# Patient Record
Sex: FEMALE | Race: WHITE | NOT HISPANIC OR LATINO
[De-identification: names, ages, dates, MRNs, and addresses within clinical notes are randomized per-mention and may not be internally consistent; named-entity substitution may affect disease eponyms.]

---

## 2019-07-03 ENCOUNTER — TRANSCRIPTION ENCOUNTER (OUTPATIENT)
Age: 49
End: 2019-07-03

## 2019-07-03 ENCOUNTER — OUTPATIENT (OUTPATIENT)
Dept: OUTPATIENT SERVICES | Facility: HOSPITAL | Age: 49
LOS: 1 days | End: 2019-07-03
Payer: COMMERCIAL

## 2019-07-03 DIAGNOSIS — Z90.711 ACQUIRED ABSENCE OF UTERUS WITH REMAINING CERVICAL STUMP: Chronic | ICD-10-CM

## 2019-07-03 DIAGNOSIS — I48.0 PAROXYSMAL ATRIAL FIBRILLATION: ICD-10-CM

## 2019-07-03 DIAGNOSIS — R55 SYNCOPE AND COLLAPSE: ICD-10-CM

## 2019-07-03 LAB
ANION GAP SERPL CALC-SCNC: 12 MMOL/L — SIGNIFICANT CHANGE UP (ref 5–17)
APTT BLD: 29.6 SEC — SIGNIFICANT CHANGE UP (ref 27.5–36.3)
BUN SERPL-MCNC: 10 MG/DL — SIGNIFICANT CHANGE UP (ref 8–20)
CALCIUM SERPL-MCNC: 9.8 MG/DL — SIGNIFICANT CHANGE UP (ref 8.6–10.2)
CHLORIDE SERPL-SCNC: 103 MMOL/L — SIGNIFICANT CHANGE UP (ref 98–107)
CO2 SERPL-SCNC: 23 MMOL/L — SIGNIFICANT CHANGE UP (ref 22–29)
CREAT SERPL-MCNC: 0.7 MG/DL — SIGNIFICANT CHANGE UP (ref 0.5–1.3)
GLUCOSE SERPL-MCNC: 92 MG/DL — SIGNIFICANT CHANGE UP (ref 70–115)
HCT VFR BLD CALC: 33.6 % — LOW (ref 34.5–45)
HGB BLD-MCNC: 11.2 G/DL — LOW (ref 11.5–15.5)
INR BLD: 0.92 RATIO — SIGNIFICANT CHANGE UP (ref 0.88–1.16)
MAGNESIUM SERPL-MCNC: 2.1 MG/DL — SIGNIFICANT CHANGE UP (ref 1.6–2.6)
MCHC RBC-ENTMCNC: 30.4 PG — SIGNIFICANT CHANGE UP (ref 27–34)
MCHC RBC-ENTMCNC: 33.3 GM/DL — SIGNIFICANT CHANGE UP (ref 32–36)
MCV RBC AUTO: 91.3 FL — SIGNIFICANT CHANGE UP (ref 80–100)
PLATELET # BLD AUTO: 240 K/UL — SIGNIFICANT CHANGE UP (ref 150–400)
POTASSIUM SERPL-MCNC: 4.1 MMOL/L — SIGNIFICANT CHANGE UP (ref 3.5–5.3)
POTASSIUM SERPL-SCNC: 4.1 MMOL/L — SIGNIFICANT CHANGE UP (ref 3.5–5.3)
PROTHROM AB SERPL-ACNC: 10.6 SEC — SIGNIFICANT CHANGE UP (ref 10–12.9)
RBC # BLD: 3.68 M/UL — LOW (ref 3.8–5.2)
RBC # FLD: 12.5 % — SIGNIFICANT CHANGE UP (ref 10.3–14.5)
SODIUM SERPL-SCNC: 138 MMOL/L — SIGNIFICANT CHANGE UP (ref 135–145)
WBC # BLD: 6.67 K/UL — SIGNIFICANT CHANGE UP (ref 3.8–10.5)
WBC # FLD AUTO: 6.67 K/UL — SIGNIFICANT CHANGE UP (ref 3.8–10.5)

## 2019-07-03 PROCEDURE — 93325 DOPPLER ECHO COLOR FLOW MAPG: CPT

## 2019-07-03 PROCEDURE — 85730 THROMBOPLASTIN TIME PARTIAL: CPT

## 2019-07-03 PROCEDURE — 36415 COLL VENOUS BLD VENIPUNCTURE: CPT

## 2019-07-03 PROCEDURE — 83735 ASSAY OF MAGNESIUM: CPT

## 2019-07-03 PROCEDURE — 93312 ECHO TRANSESOPHAGEAL: CPT

## 2019-07-03 PROCEDURE — 93005 ELECTROCARDIOGRAM TRACING: CPT

## 2019-07-03 PROCEDURE — 85610 PROTHROMBIN TIME: CPT

## 2019-07-03 PROCEDURE — 80048 BASIC METABOLIC PNL TOTAL CA: CPT

## 2019-07-03 PROCEDURE — 85027 COMPLETE CBC AUTOMATED: CPT

## 2019-07-03 PROCEDURE — 93010 ELECTROCARDIOGRAM REPORT: CPT

## 2019-07-03 PROCEDURE — 93320 DOPPLER ECHO COMPLETE: CPT

## 2019-07-03 RX ORDER — RISPERIDONE 4 MG/1
3 TABLET ORAL ONCE
Refills: 0 | Status: COMPLETED | OUTPATIENT
Start: 2019-07-03 | End: 2019-07-03

## 2019-07-03 RX ORDER — CEPHALEXIN 500 MG
0 CAPSULE ORAL
Qty: 0 | Refills: 0 | DISCHARGE

## 2019-07-03 RX ORDER — CHOLECALCIFEROL (VITAMIN D3) 125 MCG
1 CAPSULE ORAL
Qty: 0 | Refills: 0 | DISCHARGE

## 2019-07-03 RX ADMIN — RISPERIDONE 3 MILLIGRAM(S): 4 TABLET ORAL at 18:07

## 2019-07-03 NOTE — H&P PST ADULT - RS GEN PE MLT RESP DETAILS PC
normal/good air movement/respirations non-labored/airway patent/breath sounds equal/clear to auscultation bilaterally/no chest wall tenderness

## 2019-07-03 NOTE — DISCHARGE NOTE PROVIDER - NSDCCPTREATMENT_GEN_ALL_CORE_FT
PRINCIPAL PROCEDURE  Procedure: Echocardiogram, transesophageal  Findings and Treatment: no pfo  no thrombus  EF 65%      SECONDARY PROCEDURE  Procedure: Insertion, implantable loop recorder  Findings and Treatment: site care PRINCIPAL PROCEDURE  Procedure: Echocardiogram, transesophageal  Findings and Treatment: no pfo  no thrombus  EF 65%      SECONDARY PROCEDURE  Procedure: Insertion, implantable loop recorder  Findings and Treatment: site care  steristips dermabond and suture in place do not remove any of your dressing  follow up outpt with dr. alarcon

## 2019-07-03 NOTE — DISCHARGE NOTE NURSING/CASE MANAGEMENT/SOCIAL WORK - NSDCDPATPORTLINK_GEN_ALL_CORE
You can access the [facility] Patient Portal, offered by [facility], by registering with the following website: [email or web address]

## 2019-07-03 NOTE — DISCHARGE NOTE PROVIDER - CARE PROVIDER_API CALL
Poppy Almaguer)  Internal Medicine  [address] [place] [id number]  Phone: [phone]  Fax: [phone]  Follow Up Time:

## 2019-07-03 NOTE — H&P PST ADULT - OTHER CARE PROVIDERS
Dr. Almaguer (Cardiologist) Dr. Almaguer (Cardiologist), Dr. Wick (Neurologist), Dr. Fontanez (Psychiatrist)

## 2019-07-03 NOTE — PROGRESS NOTE ADULT - SUBJECTIVE AND OBJECTIVE BOX
[place] [facility]                                   [address] [place] [address]                                                      PHONE: [phone]                                                         FAX: [phone]  [email or web address]  ---------------------------------------------------------------------------------------------------------------------------------    Overnight events/patient complaints: here for SERGO poss loop to exclude CSE      Bactrim (Rash)  ciprofloxacin (Rash)  contrast media (iodine-based) (Anaphylaxis)  Peaches (Rash)  Pears (Rash)  Plums (Rash)  shellfish. (Rash)  sulfa drugs (Rash)  sulfamethoxazole-trimethoprim (Rash)  walnut (Rash)    MEDICATIONS  (STANDING):    MEDICATIONS  (PRN):      Vital Signs Last 24 Hrs  T(C): --  T(F): --  HR: --  BP: --  BP(mean): --  RR: --  SpO2: --  Daily     Daily   ICU Vital Signs Last 24 Hrs  AMINA ALY  I&O's Detail    I&O's Summary    Drug Dosing Weight  AMINACRISTIANO WISEGENIE      PHYSICAL EXAM:  General: Appears well developed, well nourished alert and cooperative.  HEENT: Head; normocephalic, atraumatic.  Eyes: Pupils reactive, cornea wnl.  Neck: Supple, no nodes adenopathy, no NVD or carotid bruit or thyromegaly.  CARDIOVASCULAR: Normal S1 and S2, No murmur, rub, gallop or lift.   LUNGS: No rales, rhonchi or wheeze. Normal breath sounds bilaterally.  ABDOMEN: Soft, nontender without mass or organomegaly. bowel sounds normoactive.  EXTREMITIES: No clubbing, cyanosis or edema. Distal pulses wnl.   SKIN: warm and dry with normal turgor.  NEURO: Alert/oriented x 3/normal motor exam. No pathologic reflexes.    PSYCH: normal affect.        LABS:                        11.2   6.67  )-----------( 240      ( 03 Jul 2019 12:57 )             33.6     07-03    138  |  103  |  10.0  ----------------------------<  92  4.1   |  23.0  |  0.70    Ca    9.8      03 Jul 2019 12:57  Mg     2.1     07-03      AMINA ALY      PT/INR - ( 03 Jul 2019 12:57 )   PT: 10.6 sec;   INR: 0.92 ratio         PTT - ( 03 Jul 2019 12:57 )  PTT:29.6 sec      SERGO performd with anesthesia standby, pt tolerated well  Results:  LVEF 65%  Trace MR mild LAE  Normal AV  Normal TV   No PFO   No intracardiac thrmbus  Normal aortic contour  No CSE detected

## 2019-07-03 NOTE — DISCHARGE NOTE PROVIDER - NSDCCPCAREPLAN_GEN_ALL_CORE_FT
PRINCIPAL DISCHARGE DIAGNOSIS  Diagnosis: Status post transesophageal echocardiogram (SERGO)  Assessment and Plan of Treatment: EF 65%  trace MR mild LAE  normal AV, TV  no PFO  no thrombus      SECONDARY DISCHARGE DIAGNOSES  Diagnosis: Status post placement of implantable loop recorder  Assessment and Plan of Treatment:

## 2019-07-03 NOTE — H&P PST ADULT - NSICDXPROBLEM_GEN_ALL_CORE_FT
PROBLEM DIAGNOSES  Problem: Syncope  Assessment and Plan: -SERGO with Dr. Almaguer  -Procedure d/w pt; risks and benefits explained, questions answered

## 2019-07-03 NOTE — H&P PST ADULT - ASSESSMENT
This is a 50 y/o F, former smoker, PMHx of HTN, HLD, CVA/ syncopal episodes presents today for SERGO to exclude cardiac etiology with Dr. Almaguer

## 2019-07-03 NOTE — PROGRESS NOTE ADULT - ASSESSMENT
Assessment  Cryptogenic CVA-lacunar infarct  Syncope  Prior h/o HTN  No cardiac source emboli detected on SERGO  Mild LAE r/o PAF  HLD      Rec   cont asa   implantable loop today to exclude PAF or bradycardia  close BP monitoring

## 2019-07-03 NOTE — DISCHARGE NOTE PROVIDER - HOSPITAL COURSE
This is a 50 y/o white F, former smoker, with a PMHx anxiety, asthma (intermittently steroid dependent; last treatment 1 year ago), depression, HTN, HLD, cryptogenic CVA (lacunar infarct possibly r/t HTN), syncopal episodes, cyclothymic mood disorder;    now s/p SERGO with Dr. Almaguer to exclude a cardiac source.     SERGO performd with anesthesia standby, pt tolerated well    Results:    LVEF 65%    Trace MR mild LAE    Normal AV    Normal TV     No PFO     No intracardiac thrmbus    Normal aortic contour    No CSE detected        s/p ILR implantation with Dr. Smith            General: Appears well developed, well nourished alert and cooperative.    Neck: Supple, no nodes adenopathy, no NVD or carotid bruit     CARDIOVASCULAR: Normal S1 and S2, No murmur, rub, gallop     LUNGS: No rales, rhonchi or wheeze. Normal breath sounds bilaterally.    ABDOMEN: Soft, nontender without mass or organomegaly. bowel sounds normoactive.    EXTREMITIES: No clubbing, cyanosis or edema. Distal pulses wnl.     SKIN: warm and dry with normal turgor.    NEURO: Alert/oriented x 3/normal motor exam. No pathologic reflexes.      PSYCH: flat affect. This is a 48 y/o white F, former smoker, with a PMHx anxiety, asthma (intermittently steroid dependent; last treatment 1 year ago), depression, HTN, HLD, cryptogenic CVA (lacunar infarct possibly r/t HTN), syncopal episodes, cyclothymic mood disorder;    now s/p SERGO with Dr. Almaguer to exclude a cardiac source.     SERGO performd with anesthesia standby, pt tolerated well    Results:    LVEF 65%    Trace MR mild LAE    Normal AV    Normal TV     No PFO     No intracardiac thrmbus    Normal aortic contour    No CSE detected        s/p ILR implantation with Dr. Smith    +sutures, steristrips and dermabond            General: Appears well developed, well nourished alert and cooperative.    Neck: Supple, no nodes adenopathy, no NVD or carotid bruit     CARDIOVASCULAR: Normal S1 and S2, No murmur, rub, gallop     LUNGS: No rales, rhonchi or wheeze. Normal breath sounds bilaterally.    ABDOMEN: Soft, nontender without mass or organomegaly. bowel sounds normoactive.    EXTREMITIES: No clubbing, cyanosis or edema. Distal pulses wnl.     SKIN: warm and dry with normal turgor.    NEURO: Alert/oriented x 3/normal motor exam. No pathologic reflexes.      PSYCH: flat affect.

## 2019-07-03 NOTE — H&P PST ADULT - NSICDXPASTMEDICALHX_GEN_ALL_CORE_FT
PAST MEDICAL HISTORY:  Allergic to IV contrast anaphylaxis    Asthma     Former cigarette smoker     H/O multiple allergies     HLD (hyperlipidemia)     HTN (hypertension)     Pseudoseizures     Syncope

## 2019-07-03 NOTE — H&P PST ADULT - HISTORY OF PRESENT ILLNESS
This is a 50 y/o white F, former smoker, with a PMHx anxiety, asthma (intermittently steroid dependent), depression, HTN, HLD, cryptogenic CVA (lacunar infarct possibly r/t HTN), syncopal episodes, cyclothymic mood disorder, presents today for SERGO with Dr. Almaguer to exclude a cardiac source.   Currently denies chest pain, SOB, palps, dysnpea, lower extremity edema.    ASA and Mallampati per anesthesia     Echo report from 8/24/18 revealed normal LV size, mild concentric LVH, LAD normal in size, RV cavity size normal, RV global systolic function, RA cavity size normal, no evidence of aortic regurgitation, no evidence of aortic stenosis, no evidence of mitral regurg, no evidence of mitral stenosis, no pulmonic stenosis, EF 60-65%, RV global systolic function is normal, mild TR This is a 50 y/o white F, former smoker, with a PMHx anxiety, asthma (intermittently steroid dependent; last treatment 1 year ago), depression, HTN, HLD, cryptogenic CVA (lacunar infarct possibly r/t HTN), syncopal episodes, cyclothymic mood disorder, presents today for SERGO with Dr. Almaguer to exclude a cardiac source.   Currently denies chest pain, SOB, palps, dysnpea, lower extremity edema.    ASA and Mallampati per anesthesia     Echo report from 8/24/18 revealed normal LV size, mild concentric LVH, LAD normal in size, RV cavity size normal, RV global systolic function, RA cavity size normal, no evidence of aortic regurgitation, no evidence of aortic stenosis, no evidence of mitral regurg, no evidence of mitral stenosis, no pulmonic stenosis, EF 60-65%, RV global systolic function is normal, mild TR This is a 48 y/o white F, former smoker, with a PMHx anxiety, asthma (intermittently steroid dependent; last treatment 1 year ago), depression, HTN, HLD, cryptogenic CVA (lacunar infarct possibly r/t HTN), syncopal episodes, cyclothymic mood disorder, presents today for SERGO with Dr. Almaguer to exclude a cardiac source.   S/P SERGO pt to have ILR  Currently denies chest pain, SOB, palps, dysnpea, lower extremity edema.    ASA and Mallampati per anesthesia     Echo report from 8/24/18 revealed normal LV size, mild concentric LVH, LAD normal in size, RV cavity size normal, RV global systolic function, RA cavity size normal, no evidence of aortic regurgitation, no evidence of aortic stenosis, no evidence of mitral regurg, no evidence of mitral stenosis, no pulmonic stenosis, EF 60-65%, RV global systolic function is normal, mild TR

## 2022-08-03 PROBLEM — Z91.89 OTHER SPECIFIED PERSONAL RISK FACTORS, NOT ELSEWHERE CLASSIFIED: Chronic | Status: ACTIVE | Noted: 2019-07-03

## 2022-08-03 PROBLEM — I10 ESSENTIAL (PRIMARY) HYPERTENSION: Chronic | Status: ACTIVE | Noted: 2019-07-03

## 2022-08-03 PROBLEM — R55 SYNCOPE AND COLLAPSE: Chronic | Status: ACTIVE | Noted: 2019-07-03

## 2022-08-03 PROBLEM — F44.5 CONVERSION DISORDER WITH SEIZURES OR CONVULSIONS: Chronic | Status: ACTIVE | Noted: 2019-07-03

## 2022-08-03 PROBLEM — Z87.891 PERSONAL HISTORY OF NICOTINE DEPENDENCE: Chronic | Status: ACTIVE | Noted: 2019-07-03

## 2022-08-03 PROBLEM — Z91.041 RADIOGRAPHIC DYE ALLERGY STATUS: Chronic | Status: ACTIVE | Noted: 2019-07-03

## 2022-08-03 PROBLEM — J45.909 UNSPECIFIED ASTHMA, UNCOMPLICATED: Chronic | Status: ACTIVE | Noted: 2019-07-03

## 2022-08-03 PROBLEM — E78.5 HYPERLIPIDEMIA, UNSPECIFIED: Chronic | Status: ACTIVE | Noted: 2019-07-03

## 2022-08-16 ENCOUNTER — RESULT CHARGE (OUTPATIENT)
Age: 52
End: 2022-08-16

## 2022-08-16 ENCOUNTER — APPOINTMENT (OUTPATIENT)
Dept: OBGYN | Facility: CLINIC | Age: 52
End: 2022-08-16

## 2022-08-16 VITALS
SYSTOLIC BLOOD PRESSURE: 120 MMHG | HEIGHT: 60 IN | BODY MASS INDEX: 40.25 KG/M2 | WEIGHT: 205 LBS | DIASTOLIC BLOOD PRESSURE: 80 MMHG

## 2022-08-16 DIAGNOSIS — Z01.419 ENCOUNTER FOR GYNECOLOGICAL EXAMINATION (GENERAL) (ROUTINE) W/OUT ABNORMAL FINDINGS: ICD-10-CM

## 2022-08-16 PROBLEM — Z00.00 ENCOUNTER FOR PREVENTIVE HEALTH EXAMINATION: Status: ACTIVE | Noted: 2022-08-16

## 2022-08-16 LAB
HCG UR QL: POSITIVE
QUALITY CONTROL: YES

## 2022-08-16 PROCEDURE — 81025 URINE PREGNANCY TEST: CPT

## 2022-08-16 PROCEDURE — 99386 PREV VISIT NEW AGE 40-64: CPT

## 2022-08-16 PROCEDURE — 99213 OFFICE O/P EST LOW 20 MIN: CPT | Mod: 25

## 2022-08-16 NOTE — DISCUSSION/SUMMARY
[FreeTextEntry1] : disc with Dr. vargas this pt has a + preg test, a lot of pelvic pain , had hysterectomy 10 yrs ago. \par She will have a vag sono and  beta HCG . and follow up \par Poss will need a CT or MRI pt aware

## 2022-08-16 NOTE — PHYSICAL EXAM
[Appropriately responsive] : appropriately responsive [Alert] : alert [No Acute Distress] : no acute distress [No Lymphadenopathy] : no lymphadenopathy [Soft] : soft [Non-tender] : non-tender [Non-distended] : non-distended [No HSM] : No HSM [No Lesions] : no lesions [No Mass] : no mass [Oriented x3] : oriented x3 [Examination Of The Breasts] : a normal appearance [No Masses] : no breast masses were palpable [Labia Majora] : normal [Labia Minora] : normal [Normal] : normal [Motion Tenderness] : motion tenderness [Uterine Adnexae] : normal [No Tenderness] : no tenderness [FreeTextEntry5] : cmt [FreeTextEntry9] : -mass,-guiac

## 2022-08-16 NOTE — HISTORY OF PRESENT ILLNESS
[Patient reported colonoscopy was normal] : Patient reported colonoscopy was normal [Currently Active] : currently active [Men] : men [Vaginal] : vaginal [No] : No [FreeTextEntry1] : 53 yo here for a new pt visit. \par Hx of a stroke 2019 no residual now . \par  Hysterectomy 10 yrs ago for endometriosis\par normal paps\par Ingris last year had cyst thjat was bx -benign. \par SHe wears a LOOp recorder since her stroke. sent to cardiologist. \par \par Pt went to [facility], she had bad HA ,vomiting, and a shiv cardia. She had a preg test +, sent her to the ER , she was dx with shiv cardia and vertigo. SHe has bad cramps now and some spotting yesterday. \par SHe still has contstant pelvic pain. She states its like a stabing pain, occ with resolve, \par + dysparunia, no pain with BM , some disc with urination , she has a DC.  [ColonoscopyDate] : 2020 [TextBox_43] : rtn 5yrs, endoscopy +ulcer

## 2022-08-18 LAB
HCG SERPL-MCNC: 11 MIU/ML
HPV HIGH+LOW RISK DNA PNL CVX: NOT DETECTED

## 2022-08-22 ENCOUNTER — NON-APPOINTMENT (OUTPATIENT)
Age: 52
End: 2022-08-22

## 2022-08-22 LAB — CYTOLOGY CVX/VAG DOC THIN PREP: NORMAL

## 2022-08-29 ENCOUNTER — NON-APPOINTMENT (OUTPATIENT)
Age: 52
End: 2022-08-29

## 2022-08-30 ENCOUNTER — ASOB RESULT (OUTPATIENT)
Age: 52
End: 2022-08-30

## 2022-08-30 ENCOUNTER — APPOINTMENT (OUTPATIENT)
Dept: ANTEPARTUM | Facility: CLINIC | Age: 52
End: 2022-08-30

## 2022-08-30 ENCOUNTER — APPOINTMENT (OUTPATIENT)
Dept: OBGYN | Facility: CLINIC | Age: 52
End: 2022-08-30

## 2022-08-30 VITALS
WEIGHT: 203 LBS | HEIGHT: 60 IN | DIASTOLIC BLOOD PRESSURE: 78 MMHG | BODY MASS INDEX: 39.85 KG/M2 | SYSTOLIC BLOOD PRESSURE: 120 MMHG

## 2022-08-30 PROCEDURE — 76856 US EXAM PELVIC COMPLETE: CPT | Mod: 59

## 2022-08-30 PROCEDURE — 76830 TRANSVAGINAL US NON-OB: CPT

## 2022-08-30 PROCEDURE — 99213 OFFICE O/P EST LOW 20 MIN: CPT

## 2022-08-30 NOTE — HISTORY OF PRESENT ILLNESS
[FreeTextEntry1] : 51 yo here for a new pt visit. \par Hx of a stroke 2019 no residual now . \par  Hysterectomy 10 yrs ago for endometriosis\par normal paps\par Ingris last year had cyst thjat was bx -benign. \par SHe wears a LOOp recorder since her stroke. sent to cardiologist. \par \par She has every 2-3 days has spotting  had a + preg test. SHe now had a normal sono today \par

## 2022-08-30 NOTE — DISCUSSION/SUMMARY
[FreeTextEntry1] : DIsc with Dr. Oreilly the hcg was 11, normal sono\par   will do an ecc and a HCG tumor marker.

## 2022-08-31 ENCOUNTER — TRANSCRIPTION ENCOUNTER (OUTPATIENT)
Age: 52
End: 2022-08-31

## 2022-09-08 LAB
FSH SERPL-MCNC: 97.3 IU/L
HCG-TM SERPL-MCNC: 10 MIU/ML

## 2022-09-14 ENCOUNTER — APPOINTMENT (OUTPATIENT)
Dept: OBGYN | Facility: CLINIC | Age: 52
End: 2022-09-14

## 2022-09-14 VITALS
HEIGHT: 60 IN | DIASTOLIC BLOOD PRESSURE: 82 MMHG | SYSTOLIC BLOOD PRESSURE: 145 MMHG | WEIGHT: 204 LBS | BODY MASS INDEX: 40.05 KG/M2

## 2022-09-14 PROCEDURE — 57505 ENDOCERVICAL CURETTAGE: CPT

## 2022-09-14 PROCEDURE — 99214 OFFICE O/P EST MOD 30 MIN: CPT | Mod: 25

## 2022-09-17 NOTE — PLAN
[FreeTextEntry1] : 52-year-old female with history of supracervical hysterectomy for endometriosis with vaginal spotting, positive beta hCG, and pelvic pain.\par \par 1.  Vaginal spotting.  Differential diagnosis reviewed with the patient.  ECC performed.  Call parameters reviewed.  She will return to the office in 2 weeks to discuss the results of the ECC.\par \par 2.  Positive serum beta-hCG.  Work-up complete and negative.  Most likely pituitary secretion of beta hCG.\par \par 3.  History of endometriosis.  Chronic pelvic pain.  We discussed options including additional imaging with MRI of the pelvis.  We discussed medical options including Lupron to attempt to put her into menopause.  We also discussed surgical options including a laparoscopic bilateral oophorectomy and trachelectomy.  All risks, benefits and potential complications of all options were reviewed with the patient.  She will think about her options and we will review further at her follow-up visit.\par \par The patient was given the opportunity to ask questions and all were answered to her satisfaction.\par

## 2022-09-17 NOTE — HISTORY OF PRESENT ILLNESS
[FreeTextEntry1] : 52-year-old female presents for consultation and ECC.  The patient is a new patient to our practice to establish care on August 16, 2022.  She has a history of a supracervical hysterectomy for endometriosis in 2010.  She states for 4 years after the procedure she felt very good.  She states after that she began having pelvic pain again.  She states the pelvic pain has been getting worse over the past few years.  She states she cannot take Motrin due to a history of stomach ulcers but takes Tylenol.  She states she has been taking Tylenol more frequently.  She states intercourse is extremely painful.  She states she gets sharp pain with intercourse.  She avoids intercourse because it is so painful.  She states she has been having hot flashes for the past year.  She states 1 month ago she had vaginal spotting.  She went to her primary care and had a positive beta hCG.  She had a work-up done with our office.  Beta-hCG is positive but FSH is high indicative of pituitary secretion of beta hCG.  The patient is here for an ECC to assess her cervical cells secondary to the spotting.  She would also like to discuss her treatment options for pelvic pain.

## 2022-09-17 NOTE — PHYSICAL EXAM
[Chaperone Declined] : Patient declined chaperone [Appropriately responsive] : appropriately responsive [Alert] : alert [No Acute Distress] : no acute distress [Soft] : soft [Non-tender] : non-tender [Non-distended] : non-distended [No HSM] : No HSM [No Lesions] : no lesions [No Mass] : no mass [Oriented x3] : oriented x3 [Labia Majora] : normal [Labia Minora] : normal [Normal] : normal [Absent] : absent [Uterine Adnexae] : normal

## 2022-09-19 LAB — CORE LAB BIOPSY: NORMAL

## 2022-09-27 ENCOUNTER — APPOINTMENT (OUTPATIENT)
Dept: OBGYN | Facility: CLINIC | Age: 52
End: 2022-09-27

## 2022-09-27 VITALS
WEIGHT: 206 LBS | SYSTOLIC BLOOD PRESSURE: 125 MMHG | HEIGHT: 60 IN | DIASTOLIC BLOOD PRESSURE: 77 MMHG | BODY MASS INDEX: 40.44 KG/M2

## 2022-09-27 PROCEDURE — 99214 OFFICE O/P EST MOD 30 MIN: CPT

## 2022-09-29 RX ORDER — LEUPROLIDE ACETATE 3.75 MG
3.75 KIT INTRAMUSCULAR
Qty: 1 | Refills: 5 | Status: ACTIVE | COMMUNITY
Start: 2022-09-29 | End: 1900-01-01

## 2022-10-10 ENCOUNTER — NON-APPOINTMENT (OUTPATIENT)
Age: 52
End: 2022-10-10

## 2022-10-18 ENCOUNTER — APPOINTMENT (OUTPATIENT)
Dept: OBGYN | Facility: CLINIC | Age: 52
End: 2022-10-18

## 2022-10-18 VITALS — WEIGHT: 204 LBS | HEIGHT: 61 IN | BODY MASS INDEX: 38.51 KG/M2

## 2022-10-18 DIAGNOSIS — Z87.42 PERSONAL HISTORY OF OTHER DISEASES OF THE FEMALE GENITAL TRACT: ICD-10-CM

## 2022-10-18 PROCEDURE — 99212 OFFICE O/P EST SF 10 MIN: CPT | Mod: 25

## 2022-10-18 PROCEDURE — 96402 CHEMO HORMON ANTINEOPL SQ/IM: CPT

## 2022-10-18 RX ORDER — LEUPROLIDE ACETATE 3.75 MG
3.75 KIT INTRAMUSCULAR
Qty: 1 | Refills: 0 | Status: COMPLETED | OUTPATIENT
Start: 2022-10-18

## 2022-10-18 RX ADMIN — LEUPROLIDE ACETATE MG: KIT at 00:00

## 2022-11-15 ENCOUNTER — APPOINTMENT (OUTPATIENT)
Dept: OBGYN | Facility: CLINIC | Age: 52
End: 2022-11-15
Payer: COMMERCIAL

## 2022-11-15 VITALS
SYSTOLIC BLOOD PRESSURE: 135 MMHG | DIASTOLIC BLOOD PRESSURE: 78 MMHG | BODY MASS INDEX: 39.46 KG/M2 | HEIGHT: 61 IN | WEIGHT: 209 LBS

## 2022-11-15 PROCEDURE — 96402 CHEMO HORMON ANTINEOPL SQ/IM: CPT

## 2022-11-15 PROCEDURE — 99213 OFFICE O/P EST LOW 20 MIN: CPT | Mod: 25

## 2022-11-26 NOTE — PLAN
[FreeTextEntry1] : 52-year-old female with history of supracervical hysterectomy for endometriosis with vaginal spotting, positive hCG, and pelvic pain.\par \par 1.  Vaginal spotting with positive serum beta-hCG.  ECC was reviewed with the patient.  She is aware that the results are benign.  Her work-up is now complete.  2.  History of endometriosis and chronic pelvic pain.  We discussed options including\par \par 2.  History of endometriosis and chronic pelvic pain.  We discussed treatment options including additional imaging with MRI of the pelvis.  We discussed medical options including Lupron to attempt to put her into menopause.  We also discussed surgical options including a laparoscopic bilateral salpingo-oophorectomy and trachelectomy.  Again, all risks, benefits and potential complications of all options were reviewed with the patient.  She is most interested in starting Lupron.  Rx was given for Lupron injections monthly x6 months.  All risks, benefits and potential complications of Lupron were reviewed with the patient.  She has been taking Motrin and Tylenol around-the-clock for her pain.  As her pain has not gotten better we will try diclofenac 50 mg 3 times daily.  We discussed that if she has a history of stomach ulcers she should take this with food.  We also discussed medications to help coat the lining of her stomach.  We also discussed that she can stagger the doses of diclofenac with Tylenol.  The patient was given the opportunity to ask questions and all were answered to her satisfaction.  She will return to the office for her Lupron injection and to discuss how she is feeling with the new medication regimen.\par \par

## 2022-11-26 NOTE — HISTORY OF PRESENT ILLNESS
[FreeTextEntry1] : 52-year-old female presents for ECC results and to discuss treatment options for pelvic pain and history of endometriosis.  She has a history of a supracervical hysterectomy for endometriosis in 2010.  She states for 4 years after the procedure she felt very good.  She states after that she began having pelvic pain again.  She states the pelvic pain has been getting worse over the past few years.  She states she cannot take Motrin due to a history of stomach ulcers but takes Tylenol.  She states she has been taking Tylenol more frequently.  She states over the past few weeks she has been taking Motrin 600 mg every 4-6 hours because the pain has been interfering with the quality of her life.  She is taking something to help coat her stomach to the history of ulcers.  She states intercourse is extremely painful.  She states she gets sharp pain with intercourse.  She avoids intercourse because it is so painful.  She states she has been having hot flashes for the past year.  She states 1 month ago she had vaginal spotting.  She went to her primary care and had a positive beta hCG.  She had a work-up done with our office.  Beta-hCG is positive but FSH is high indicative of pituitary secretion of beta hCG.  The patient is here for an ECC results.  She would also like to discuss her treatment options for pelvic pain.

## 2022-12-04 PROBLEM — Z87.42 HISTORY OF IRREGULAR MENSTRUAL CYCLES: Status: RESOLVED | Noted: 2022-08-16 | Resolved: 2022-12-04

## 2022-12-04 NOTE — HISTORY OF PRESENT ILLNESS
[FreeTextEntry1] : 52-year-old female presents for lupron injection and to discuss treatment options for pelvic pain and history of endometriosis.  She has a history of a supracervical hysterectomy for endometriosis in 2010.  She states for 4 years after the procedure she felt very good.  She states after that she began having pelvic pain again.  She states the pelvic pain has been getting worse over the past few years.  She states she cannot take Motrin due to a history of stomach ulcers but takes Tylenol.  She states she has been taking Tylenol more frequently.  She states over the past few weeks she has been taking Motrin 600 mg every 4-6 hours because the pain has been interfering with the quality of her life.  She is taking something to help coat her stomach to the history of ulcers.  At her last visit, she was given a rx for diclofenac.  She states she alternated this medication with tylenol but did not get any more relief of her symptoms.  She states intercourse is extremely painful.  She states she gets sharp pain with intercourse.  She avoids intercourse because it is so painful.  She states she has been having hot flashes for the past year.  She would like to try another anti-inflammatory.  She is also here for her first lupron injection.

## 2022-12-04 NOTE — PLAN
[FreeTextEntry1] : 52-year-old female with history of endometriosis and chronic pelvic pain.  At her last visit we had reviewed treatment options for her pain.  She was given a prescription for diclofenac 50 mg 3 times daily.  She states this medication did not provide her any more relief than ibuprofen.  Will Rx naproxen DS.  The patient has a history of stomach ulcers.  She was advised to take NSAIDs only as needed.  We also discussed taking medications with the NSAIDs to help coat the lining of her stomach.  Due to her chronic pelvic pain and history of endometriosis she opted for Lupron.  Her first Lupron injection was given today.  All risk, benefits and potential complications of Lupron reviewed with the patient.  We discussed potential side effects including menopausal symptoms.  Plan for Lupron injection monthly x6 months.  The patient was given the opportunity to ask questions and all were answered to her satisfaction.\par

## 2022-12-13 ENCOUNTER — APPOINTMENT (OUTPATIENT)
Dept: OBGYN | Facility: CLINIC | Age: 52
End: 2022-12-13
Payer: COMMERCIAL

## 2022-12-13 VITALS
BODY MASS INDEX: 39.27 KG/M2 | HEIGHT: 61 IN | WEIGHT: 208 LBS | SYSTOLIC BLOOD PRESSURE: 142 MMHG | DIASTOLIC BLOOD PRESSURE: 70 MMHG

## 2022-12-13 PROCEDURE — 96402 CHEMO HORMON ANTINEOPL SQ/IM: CPT

## 2022-12-13 PROCEDURE — 99213 OFFICE O/P EST LOW 20 MIN: CPT | Mod: 25

## 2022-12-13 RX ORDER — DICLOFENAC POTASSIUM 50 MG/1
50 TABLET, COATED ORAL
Qty: 30 | Refills: 0 | Status: COMPLETED | COMMUNITY
Start: 2022-10-03 | End: 2022-12-13

## 2022-12-29 ENCOUNTER — APPOINTMENT (OUTPATIENT)
Dept: MRI IMAGING | Facility: CLINIC | Age: 52
End: 2022-12-29
Payer: COMMERCIAL

## 2022-12-29 ENCOUNTER — OUTPATIENT (OUTPATIENT)
Dept: OUTPATIENT SERVICES | Facility: HOSPITAL | Age: 52
LOS: 1 days | End: 2022-12-29
Payer: COMMERCIAL

## 2022-12-29 DIAGNOSIS — N80.9 ENDOMETRIOSIS, UNSPECIFIED: ICD-10-CM

## 2022-12-29 DIAGNOSIS — Z90.711 ACQUIRED ABSENCE OF UTERUS WITH REMAINING CERVICAL STUMP: Chronic | ICD-10-CM

## 2022-12-29 PROCEDURE — 72197 MRI PELVIS W/O & W/DYE: CPT

## 2022-12-29 PROCEDURE — A9585: CPT

## 2022-12-29 PROCEDURE — 72197 MRI PELVIS W/O & W/DYE: CPT | Mod: 26

## 2023-01-09 NOTE — HISTORY OF PRESENT ILLNESS
[FreeTextEntry1] : 52-year-old female presents for second lupron injection.  She has chronic pelvic pain and endometriosis.  She states she had bad cramping after her first injection last month that resolved a few days later.  She is also experiencing more hot flashes since starting lupron.  She is wondering if there is anything to take for hot flashes.  \par \par She has a history of a supracervical hysterectomy for endometriosis in 2010.  She states for 4 years after the procedure she felt very good.  She states after that she began having pelvic pain again.  She states the pelvic pain has been getting worse over the past few years.  She was given a rx for diclofenac.  She states she alternated this medication with tylenol but did not get any more relief of her symptoms.  She tried naproxen DS after her last visit and is getting some relief.  She would like a refill of this medication.  She states intercourse is extremely painful.  She states she gets sharp pain with intercourse.  She avoids intercourse because it is so painful.

## 2023-01-09 NOTE — PLAN
[FreeTextEntry1] : 52-year-old female with history of endometriosis and chronic pelvic pain.  At her last visit we had reviewed treatment options for her pain.  Will refill Rx for naproxen DS.  The patient has a history of stomach ulcers.  She was advised to take NSAIDs only as needed.  We also discussed taking medications with the NSAIDs to help coat the lining of her stomach.  Due to her chronic pelvic pain and history of endometriosis she opted for Lupron.  Her second Lupron injection was given today.  All risk, benefits and potential complications of Lupron reviewed with the patient.  We discussed potential side effects including menopausal symptoms.  Her hot flashes have gotten worse over the past month and she would like to discuss treatment options.  Info given for relezin.  We discussed that this is a natural over the counter supplement.  Can take 2-3 months to get maximum effect.  Plan for Lupron injection monthly x6 months.  She will RTO in 1 month for her next lupron injection.  The patient was given the opportunity to ask questions and all were answered to her satisfaction.\par

## 2023-01-19 ENCOUNTER — TRANSCRIPTION ENCOUNTER (OUTPATIENT)
Age: 53
End: 2023-01-19

## 2023-01-23 NOTE — PLAN
[FreeTextEntry1] : 52-year-old female with history of endometriosis and chronic pelvic pain.  At her last visit we had reviewed treatment options for her pain.  Will refill Rx for naproxen DS.  The patient has a history of stomach ulcers.  She was advised to take NSAIDs only as needed.  We also discussed taking medications with the NSAIDs to help coat the lining of her stomach.  Due to her chronic pelvic pain and history of endometriosis she opted for Lupron.  Her third Lupron injection was given today.  All risk, benefits and potential complications of Lupron reviewed with the patient.  We discussed potential side effects including menopausal symptoms.  Her hot flashes have gotten worse and info was given for relezin.  She has not yet started this medication.  We discussed that as this is her third Lupron injection, she should get some relief if the pain is secondary to endometriosis.  She feels as if the Lupron is not helping.  The patient was evaluated today for pelvic floor dysfunction.  No evidence of pelvic floor dysfunction was noted.  However, recommend patient get evaluated by a pelvic floor physical therapist.  We will also order MRI of the pelvis to rule out any pathology.  Discussed with the patient the risks and benefits of continuing Lupron.  We discussed that we can continue Lupron for 6 months in total but that she should get some relief by the 3-month wallace.  She return to the office in 1 month to discuss the results of her MRI and further treatment options.  Discussed with the patient that if she is not getting relief we may need to send her to a specialist.  The patient was given the opportunity to ask questions and all were answered to her satisfaction.\par

## 2023-01-23 NOTE — HISTORY OF PRESENT ILLNESS
[FreeTextEntry1] : 52-year-old female presents for third lupron injection.  She has chronic pelvic pain and endometriosis.  She is still complaining of bad cramping and pelvic pain.  States intercourse is still painful.  She is unsure if she is getting relief from the lupron.  She is experiencing more hot flashes since starting lupron.  She was given information on relezin but has not yet tried the medication.  She is still taking naproxen.  She is requesting a refill.  \par \par She has a history of a supracervical hysterectomy for endometriosis in 2010.  She states for 4 years after the procedure she felt very good.  She states after that she began having pelvic pain again.  She states the pelvic pain has been getting worse over the past few years. She states intercourse is extremely painful.  She states she gets sharp pain with intercourse.  She avoids intercourse because it is so painful.

## 2023-01-27 ENCOUNTER — APPOINTMENT (OUTPATIENT)
Dept: OBGYN | Facility: CLINIC | Age: 53
End: 2023-01-27
Payer: COMMERCIAL

## 2023-01-27 VITALS
DIASTOLIC BLOOD PRESSURE: 70 MMHG | BODY MASS INDEX: 39.27 KG/M2 | HEIGHT: 61 IN | WEIGHT: 208 LBS | SYSTOLIC BLOOD PRESSURE: 132 MMHG

## 2023-01-27 PROCEDURE — 96402 CHEMO HORMON ANTINEOPL SQ/IM: CPT

## 2023-01-27 PROCEDURE — 99213 OFFICE O/P EST LOW 20 MIN: CPT | Mod: 25

## 2023-01-27 RX ORDER — LEUPROLIDE ACETATE 3.75 MG
3.75 KIT INTRAMUSCULAR
Qty: 1 | Refills: 0 | Status: COMPLETED | OUTPATIENT
Start: 2023-01-27

## 2023-01-27 RX ADMIN — LEUPROLIDE ACETATE MG: KIT at 00:00

## 2023-01-27 NOTE — PLAN
[FreeTextEntry1] : 52-year-old female with history of endometriosis and chronic pelvic pain.  Due to her chronic pelvic pain and history of endometriosis she opted for Lupron.  Her fourth Lupron injection was given today.  All risk, benefits and potential complications of Lupron reviewed with the patient.  We discussed potential side effects including menopausal symptoms.  Her hot flashes have gotten worse and info was given for relezin.  She has not yet started this medication.  She is also complaining of vaginal dryness with intercourse.  Info was given on lubricants to use during intercourse.  We discussed oil based versus water based versus silicone based lubricants.  She had an MRI of the pelvis.  We discussed the results of her MRI which was negative.  The patient was evaluated at her last visit for pelvic floor dysfunction.  She has a appointment scheduled with urogynecology for pelvic floor PT on March 15.  Discussed with the patient that now she is getting some relief with the Lupron.  We will continue Lupron for the full treatment course of 6 doses.  We discussed that if she does not feel better after the course of Lupron and pelvic floor PT that we can send her to an endometriosis specialist.  The patient was given the opportunity to ask questions and all were answered to her satisfaction.\par \par

## 2023-01-27 NOTE — HISTORY OF PRESENT ILLNESS
[FreeTextEntry1] : 52-year-old female presents for fourth lupron injection and to review the results of her MRI.  She has chronic pelvic pain and endometriosis.  She has a history of a supracervical hysterectomy for endometriosis in 2010.  She states for 4 years after the procedure she felt very good but then began having very bad pelvic pain.  She states since her last lupron injection she has noticed a slight improvement in her pelvic pain.  She is taking naproxen as needed with good relief.  Prior to starting Lupron, she was unable to be sexually active.  She states she did try to have intercourse over the last month and it has been tolerable as long as she controls the depth of penetration.  She states she is experiencing vaginal dryness.  She is wondering what she can use for lubrication.  She feels as if her pelvic muscles are tight.  She states she made an appointment with pelvic floor PT that is scheduled for March 15.  She is experiencing more hot flashes since starting lupron.  She was given information on relezin but has not yet tried the medication.\par \par Over the last month she was diagnosed with a TIA and seizures.  She is following with neurology.  She was started on amlodipine.

## 2023-02-01 ENCOUNTER — APPOINTMENT (OUTPATIENT)
Dept: UROGYNECOLOGY | Facility: CLINIC | Age: 53
End: 2023-02-01
Payer: COMMERCIAL

## 2023-02-01 VITALS — DIASTOLIC BLOOD PRESSURE: 71 MMHG | TEMPERATURE: 98 F | SYSTOLIC BLOOD PRESSURE: 146 MMHG

## 2023-02-01 DIAGNOSIS — R35.1 NOCTURIA: ICD-10-CM

## 2023-02-01 PROCEDURE — 99205 OFFICE O/P NEW HI 60 MIN: CPT

## 2023-02-01 PROCEDURE — 51798 US URINE CAPACITY MEASURE: CPT

## 2023-02-01 RX ORDER — FLUTICASONE FUROATE AND VILANTEROL TRIFENATATE 200; 25 UG/1; UG/1
200-25 POWDER RESPIRATORY (INHALATION)
Refills: 0 | Status: ACTIVE | COMMUNITY
Start: 2023-02-01

## 2023-02-01 RX ORDER — AMLODIPINE BESYLATE 5 MG/1
5 TABLET ORAL
Qty: 90 | Refills: 1 | Status: ACTIVE | COMMUNITY
Start: 2023-02-01

## 2023-02-01 RX ORDER — LEUPROLIDE ACETATE 3.75 MG
3.75 KIT INTRAMUSCULAR
Refills: 0 | Status: ACTIVE | COMMUNITY
Start: 2023-02-01

## 2023-02-01 RX ORDER — LAMOTRIGINE 200 MG/1
200 TABLET ORAL DAILY
Refills: 0 | Status: ACTIVE | COMMUNITY
Start: 2023-02-01

## 2023-02-01 RX ORDER — GABAPENTIN 600 MG/1
600 TABLET, COATED ORAL
Qty: 270 | Refills: 0 | Status: ACTIVE | COMMUNITY
Start: 2023-02-01

## 2023-02-01 RX ORDER — RISPERIDONE 0.5 MG/1
0.5 TABLET, FILM COATED ORAL
Refills: 0 | Status: ACTIVE | COMMUNITY
Start: 2023-02-01

## 2023-02-01 RX ORDER — GALCANEZUMAB 120 MG/ML
120 INJECTION, SOLUTION SUBCUTANEOUS
Refills: 0 | Status: ACTIVE | COMMUNITY
Start: 2023-02-01

## 2023-02-01 RX ORDER — SERTRALINE HYDROCHLORIDE 50 MG/1
50 TABLET, FILM COATED ORAL DAILY
Refills: 0 | Status: ACTIVE | COMMUNITY
Start: 2023-02-01

## 2023-02-01 RX ORDER — RISPERIDONE 2 MG/1
2 TABLET, FILM COATED ORAL TWICE DAILY
Refills: 0 | Status: ACTIVE | COMMUNITY
Start: 2023-02-01

## 2023-02-01 NOTE — HISTORY OF PRESENT ILLNESS
[FreeTextEntry1] : Christina is a pleasant 54 yo F referred by Dr. Caruso for PFD, CPP, dyspareunia. \par \par Christina had a partial hyst (uterus) in 2010 for endo.\par Endo pain resolved x 2 years.\par Pain started 2012 mild at first has gotten progressively worse over the years. Is on Lupron now, started November. \par Sexually active with 1 male partner. \par Endorses entry (burning) and deep (hitting a wall) dyspareunia.\par No pain with prolonged sitting or tight clothes. \par \par Never able to wear tampon 2/2 pain.\par Not able to differentiate b/l endo and PFD pain.\par MRI wnl December 2022\par \par Menarche age 14, regular q month\par OCP start 18 x 1 year\par No other contraception\par Paps wnl\par No RVVC, RBV\par mammograms wnl, bx benign last year. \par Sister with hx of endometrial CA\par \par Daytime frequency x6-7, nocturia x 2-3, urgency, ETHAN (wears a Poise pad and changes 1x/day), no hematuria, no RUTIs, no kidney stones, feeling of incomplete bladder emptying, no hesitancy. Incontinence since 2010, used biofeedback without improvement. \par She reports her ETHAN is not worsening. \par She does feel like "something is falling out" of her vagina.\par Previous smoker 1ppd [age]. Stopped 2 years ago.\par No  cancer in family\par \par Intermittent constipation\par \par Bipolar, anxiety, depression, seizures (2019), December 2022 TIA, 2020 stroke, CHTN

## 2023-02-01 NOTE — DISCUSSION/SUMMARY
[FreeTextEntry1] : I had an extensive discussion with Christina re: PFD, vulvar pain, dyspareunia, OAB wet, POP. I reviewed the pathologies, possible etiologies, diagnosis, symptoms and treatment options available. Written information was given to the patient.\par \par Due to her hx of stroke and TIA, I elected to start her on Traumeel 10% topical cream ftp BID to introitus along with AK 2/2% topical cream ftp QD-BID to introitus. Amount and location of cream application was demonstrated to patient today in office via mirror demonstration. I explained that the cream does not work overnight and she needs to continue it for a minimum of 6-8 weeks before we re-evaluate efficacy.\par \par Vulvar health techniques rev'd in detail:\par -Warm baths x15-20 mins QD with Aveeno oatmeal\par -Unscented soaps, body washes, bath gels\par -No fabric softeners or dryer sheets on underwear\par -Unscented pads (NOT ALWAYS)\par -Ice to vulva\par -Hypoallergenic lubricants\par -White Crisco as needed\par \par I suggested she take methocarbamol 750mg po BID. Refill sent to pharmacy.\par Referred to PFPT (Lakisha Duffy). Rx in [employer]. Contact info given to patient. Email sent to Lakisha.\par \par PFM relaxation techniques rev'd in detail:\par -Warm baths x15-20 mins QD with Aveeno oatmeal\par -No pushing, straining\par -Avoid constipation\par       *Flax seed oil capsules; 2-3 capsules 2-3x/day (titrate as tolerated)\par       *Miralax\par       *Increase water intake\par -No Kegels, no squeezing\par \par No intercourse x 1 month\par \par I explained to Christina that at this time, I think that most of her pain is coming from her PFMs and her LPV. I explained the concept of "peeling the onion" and suggested that we focus on treatment of those areas first and if after improvement there, I will refer her to Dr. Conner or Dr. Alonzo for evaluation and mgmt of her POP and OAB wet. I explained that since her vulva and PFMs are so exquisitely tender, she is not a candidate for pessary or surgery yet. She is amenable to treatment algorithm.\par \par RTO 2 months

## 2023-02-01 NOTE — PHYSICAL EXAM
[No Acute Distress] : in no acute distress [Well developed] : well developed [Well Nourished] : ~L well nourished [Good Hygeine] : demonstrates good hygeine [Oriented x3] : oriented to person, place, and time [Normal Memory] : ~T memory was ~L unimpaired [Normal Mood/Affect] : mood and affect are normal [No Edema] : ~T edema was not present [None] : no CVA tenderness [Obese] : obese [Normal Gait] : gait was normal [No Lesions] : no lesions were seen on the external genitalia [Vulvar Atrophy] : vulvar atrophy [Labia Majora] : were normal [Labia Minora] : were normal [Atrophy] : atrophy [Rectocele] : a rectocele [Cystocele] : a cystocele [No Bleeding] : there was no active vaginal bleeding [Normal] : no abnormalities [Post Void Residual ____ml] : post void residual was [unfilled] ml [Exam Deferred] : was deferred [Tenderness] : ~T no ~M abdominal tenderness observed [Distended] : not distended [de-identified] : Q-tip touch test 10/10 @ 1,5,6,7,11. Paleness at introitus with reactive erythema at ostia. No fissures, ulcerations, erosions. [FreeTextEntry3] : hypermobile urethra [FreeTextEntry4] : PFMs 3/4 with MTrPs b/l levators, all groups and b/l OI. Exquisite pain to palpation

## 2023-02-02 LAB
APPEARANCE: CLEAR
BACTERIA: NEGATIVE
BILIRUBIN URINE: NEGATIVE
BLOOD URINE: NEGATIVE
COLOR: NORMAL
GLUCOSE QUALITATIVE U: NEGATIVE
HYALINE CASTS: 0 /LPF
KETONES URINE: NEGATIVE
LEUKOCYTE ESTERASE URINE: NEGATIVE
MICROSCOPIC-UA: NORMAL
NITRITE URINE: NEGATIVE
PH URINE: 7
PROTEIN URINE: NEGATIVE
RED BLOOD CELLS URINE: 4 /HPF
SPECIFIC GRAVITY URINE: 1.02
SQUAMOUS EPITHELIAL CELLS: 0 /HPF
UROBILINOGEN URINE: NORMAL
WHITE BLOOD CELLS URINE: 0 /HPF

## 2023-02-03 LAB — BACTERIA UR CULT: NORMAL

## 2023-02-15 ENCOUNTER — TRANSCRIPTION ENCOUNTER (OUTPATIENT)
Age: 53
End: 2023-02-15

## 2023-02-28 ENCOUNTER — APPOINTMENT (OUTPATIENT)
Dept: OBGYN | Facility: CLINIC | Age: 53
End: 2023-02-28
Payer: COMMERCIAL

## 2023-02-28 VITALS
SYSTOLIC BLOOD PRESSURE: 128 MMHG | HEIGHT: 61 IN | DIASTOLIC BLOOD PRESSURE: 76 MMHG | BODY MASS INDEX: 40.59 KG/M2 | WEIGHT: 215 LBS

## 2023-02-28 PROCEDURE — 99213 OFFICE O/P EST LOW 20 MIN: CPT | Mod: 25

## 2023-02-28 PROCEDURE — 96372 THER/PROPH/DIAG INJ SC/IM: CPT

## 2023-02-28 NOTE — HISTORY OF PRESENT ILLNESS
[FreeTextEntry1] : 53-year-old female presents for fifth lupron injection.  She has chronic pelvic pain and endometriosis.  She has a history of a supracervical hysterectomy for endometriosis in 2010.  She states for 4 years after the procedure she felt very good but then began having very bad pelvic pain.  She states since her last lupron injection she has noticed a slight improvement in her pelvic pain.  She is taking naproxen as needed with good relief.  He is requesting a refill on the naproxen.  She saw urogynecology on February 1 and had an evaluation done.  She was started on an anti-inflammatory cream, amitriptyline/ketamine cream, and methocarbamol 3 times a day.  She states that she has seen an improvement in her pelvic pain since starting these medications.  She is scheduled for pelvic floor PT on March 7.  Prior to starting Lupron, she was unable to be sexually active.  She states she has had some improvement with intercourse, however after her urogyn evaluation they told her no penetration for 4 weeks.  She states she is experiencing vaginal dryness.  She is wondering what she can use for lubrication.  She is experiencing more hot flashes since starting lupron.  She was given information on relezin but has not yet tried the medication.  She plans to order this today.\par

## 2023-02-28 NOTE — PLAN
[FreeTextEntry1] : 53-year-old female with history of endometriosis and chronic pelvic pain.  Due to her chronic pelvic pain and history of endometriosis she opted for Lupron.  Her fifth Lupron injection was given today.  All risk, benefits and potential complications of Lupron reviewed with the patient.  We discussed potential side effects including menopausal symptoms.  Her hot flashes have gotten worse and info was given for relezin.  She has not yet started this medication.  She plans order this medication today.  She is also complaining of vaginal dryness with intercourse.  Info was given on lubricants to use during intercourse.  We discussed oil based versus water based versus silicone based lubricants.  She saw urogynecology and was diagnosed with vulvodynia and pelvic floor dysfunction.  She has been taking medications with some relief.  She is scheduled for her first appointment for pelvic floor PT March 7.  We will continue Lupron for the full treatment course of 6 doses.  We discussed that if she does not feel better after the course of Lupron and pelvic floor PT that we can send her to an endometriosis specialist.  The patient was given the opportunity to ask questions and all were answered to her satisfaction.\par \par

## 2023-04-03 RX ORDER — NAPROXEN SODIUM 550 MG/1
550 TABLET ORAL
Qty: 60 | Refills: 3 | Status: ACTIVE | COMMUNITY
Start: 2022-10-19 | End: 1900-01-01

## 2023-04-04 ENCOUNTER — APPOINTMENT (OUTPATIENT)
Dept: OBGYN | Facility: CLINIC | Age: 53
End: 2023-04-04
Payer: COMMERCIAL

## 2023-04-04 VITALS
DIASTOLIC BLOOD PRESSURE: 74 MMHG | BODY MASS INDEX: 41.35 KG/M2 | WEIGHT: 219 LBS | HEIGHT: 61 IN | SYSTOLIC BLOOD PRESSURE: 137 MMHG

## 2023-04-04 PROCEDURE — 96402 CHEMO HORMON ANTINEOPL SQ/IM: CPT

## 2023-04-04 PROCEDURE — 99213 OFFICE O/P EST LOW 20 MIN: CPT | Mod: 25

## 2023-04-04 PROCEDURE — 36415 COLL VENOUS BLD VENIPUNCTURE: CPT

## 2023-04-14 ENCOUNTER — NON-APPOINTMENT (OUTPATIENT)
Age: 53
End: 2023-04-14

## 2023-04-14 ENCOUNTER — APPOINTMENT (OUTPATIENT)
Dept: UROGYNECOLOGY | Facility: CLINIC | Age: 53
End: 2023-04-14

## 2023-04-21 ENCOUNTER — RX RENEWAL (OUTPATIENT)
Age: 53
End: 2023-04-21

## 2023-04-24 ENCOUNTER — TRANSCRIPTION ENCOUNTER (OUTPATIENT)
Age: 53
End: 2023-04-24

## 2023-04-26 ENCOUNTER — APPOINTMENT (OUTPATIENT)
Dept: UROGYNECOLOGY | Facility: CLINIC | Age: 53
End: 2023-04-26
Payer: COMMERCIAL

## 2023-04-26 VITALS
SYSTOLIC BLOOD PRESSURE: 146 MMHG | DIASTOLIC BLOOD PRESSURE: 75 MMHG | OXYGEN SATURATION: 97 % | HEART RATE: 66 BPM | TEMPERATURE: 97.5 F

## 2023-04-26 PROCEDURE — 99214 OFFICE O/P EST MOD 30 MIN: CPT

## 2023-04-26 PROCEDURE — 51798 US URINE CAPACITY MEASURE: CPT

## 2023-04-26 NOTE — HISTORY OF PRESENT ILLNESS
[FreeTextEntry1] : Christina is here for a f/u visit.\par \par She is seeing Lakisha for PFPT weekly. \par Using topical Traumeel and AK 2/2% to vulva. She thinks the topical creams are starting to help. \par She is taking methocarbamol 750mg BID with good results. \par She is doing exercises and using wand at home. \par \par She still has suprapubic stabbing pains. Not associated with urinary symptoms. \par Reports +UUI, especially if she holds her urine too long. \par She endorses 2 episodes of enuresis in the past 1-2 weeks. \par Denies UTI symptoms: no dysuria, hematuria, fever, chills, n/v CVAT. \par \par She has psychiatrist whom she sees regularly.

## 2023-04-26 NOTE — PHYSICAL EXAM
[No Acute Distress] : in no acute distress [Well developed] : well developed [Well Nourished] : ~L well nourished [Good Hygeine] : demonstrates good hygeine [Oriented x3] : oriented to person, place, and time [Normal Memory] : ~T memory was ~L unimpaired [Normal Mood/Affect] : mood and affect are normal [No Edema] : ~T edema was not present [None] : no CVA tenderness [Obese] : obese [Normal Gait] : gait was normal [No Lesions] : no lesions were seen on the external genitalia [Vulvar Atrophy] : vulvar atrophy [Labia Majora] : were normal [Labia Minora] : were normal [Atrophy] : atrophy [Rectocele] : a rectocele [Cystocele] : a cystocele [No Bleeding] : there was no active vaginal bleeding [Normal] : no abnormalities [Post Void Residual ____ml] : post void residual was [unfilled] ml [Exam Deferred] : was deferred [Tenderness] : ~T no ~M abdominal tenderness observed [Distended] : not distended [de-identified] : Q-tip touch test 5/10 @ 1,11 and 7/10 @ 5,6,7. Paleness at introitus with reactive erythema at ostia. No fissures, ulcerations, erosions. [FreeTextEntry3] : hypermobile urethra [FreeTextEntry4] : PFMs 3/4 with MTrPs b/l levators, all groups and b/l OI. Exquisite pain to palpation

## 2023-04-26 NOTE — DISCUSSION/SUMMARY
[FreeTextEntry1] : Continue topical AK 2/2% and Traumeel 10% cream QD-BID as using. \par Continue methocarbamol 750mg BID. \par Continue PFPT with Lakisha weekly. \par \par Told Christina to f/u with neuro for enuresis.\par Will make appointment for Christina to f/u with Dr. Conner for POP, OAB, UUI.\par \par PVR = 0ml\par \par RTO 2-3 months

## 2023-05-01 LAB
BASOPHILS # BLD AUTO: 0.07 K/UL
BASOPHILS NFR BLD AUTO: 0.8 %
EOSINOPHIL # BLD AUTO: 0.12 K/UL
EOSINOPHIL NFR BLD AUTO: 1.4 %
ESTRADIOL SERPL-MCNC: 8 PG/ML
FSH SERPL-MCNC: 14.9 IU/L
HCT VFR BLD CALC: 39 %
HGB BLD-MCNC: 12.4 G/DL
IMM GRANULOCYTES NFR BLD AUTO: 0.2 %
LYMPHOCYTES # BLD AUTO: 1.96 K/UL
LYMPHOCYTES NFR BLD AUTO: 22.8 %
MAN DIFF?: NORMAL
MCHC RBC-ENTMCNC: 29.4 PG
MCHC RBC-ENTMCNC: 31.8 GM/DL
MCV RBC AUTO: 92.4 FL
MONOCYTES # BLD AUTO: 0.56 K/UL
MONOCYTES NFR BLD AUTO: 6.5 %
NEUTROPHILS # BLD AUTO: 5.86 K/UL
NEUTROPHILS NFR BLD AUTO: 68.3 %
PLATELET # BLD AUTO: 320 K/UL
RBC # BLD: 4.22 M/UL
RBC # FLD: 13.4 %
TSH SERPL-ACNC: 1.53 UIU/ML
WBC # FLD AUTO: 8.59 K/UL

## 2023-05-02 ENCOUNTER — APPOINTMENT (OUTPATIENT)
Dept: OBGYN | Facility: CLINIC | Age: 53
End: 2023-05-02
Payer: COMMERCIAL

## 2023-05-02 VITALS
HEIGHT: 61 IN | BODY MASS INDEX: 41.16 KG/M2 | SYSTOLIC BLOOD PRESSURE: 134 MMHG | DIASTOLIC BLOOD PRESSURE: 80 MMHG | WEIGHT: 218 LBS

## 2023-05-02 DIAGNOSIS — R23.2 FLUSHING: ICD-10-CM

## 2023-05-02 DIAGNOSIS — N80.9 ENDOMETRIOSIS, UNSPECIFIED: ICD-10-CM

## 2023-05-02 DIAGNOSIS — N95.2 POSTMENOPAUSAL ATROPHIC VAGINITIS: ICD-10-CM

## 2023-05-02 PROCEDURE — 99213 OFFICE O/P EST LOW 20 MIN: CPT | Mod: 25

## 2023-05-05 ENCOUNTER — NON-APPOINTMENT (OUTPATIENT)
Age: 53
End: 2023-05-05

## 2023-05-05 DIAGNOSIS — N39.0 URINARY TRACT INFECTION, SITE NOT SPECIFIED: ICD-10-CM

## 2023-05-08 ENCOUNTER — APPOINTMENT (OUTPATIENT)
Dept: UROGYNECOLOGY | Facility: CLINIC | Age: 53
End: 2023-05-08

## 2023-05-08 ENCOUNTER — APPOINTMENT (OUTPATIENT)
Dept: UROGYNECOLOGY | Facility: CLINIC | Age: 53
End: 2023-05-08
Payer: COMMERCIAL

## 2023-05-08 DIAGNOSIS — R35.0 FREQUENCY OF MICTURITION: ICD-10-CM

## 2023-05-08 DIAGNOSIS — R30.0 DYSURIA: ICD-10-CM

## 2023-05-08 PROCEDURE — 99213 OFFICE O/P EST LOW 20 MIN: CPT | Mod: 25

## 2023-05-08 PROCEDURE — 51701 INSERT BLADDER CATHETER: CPT | Mod: 59

## 2023-05-08 RX ORDER — PHENAZOPYRIDINE HYDROCHLORIDE 200 MG/1
200 TABLET ORAL 3 TIMES DAILY
Qty: 15 | Refills: 0 | Status: ACTIVE | COMMUNITY
Start: 2023-05-08 | End: 1900-01-01

## 2023-05-09 LAB
APPEARANCE: CLEAR
BACTERIA UR CULT: NORMAL
BACTERIA: NEGATIVE /HPF
BILIRUBIN URINE: NEGATIVE
BLOOD URINE: NEGATIVE
CAST: 0 /LPF
COLOR: YELLOW
EPITHELIAL CELLS: 0 /HPF
GLUCOSE QUALITATIVE U: NEGATIVE MG/DL
KETONES URINE: NEGATIVE MG/DL
LEUKOCYTE ESTERASE URINE: NEGATIVE
MICROSCOPIC-UA: NORMAL
NITRITE URINE: NEGATIVE
PH URINE: 6
PROTEIN URINE: NEGATIVE MG/DL
RED BLOOD CELLS URINE: 0 /HPF
SPECIFIC GRAVITY URINE: 1.01
UROBILINOGEN URINE: 0.2 MG/DL
WHITE BLOOD CELLS URINE: 0 /HPF

## 2023-05-11 LAB — BACTERIA UR CULT: NORMAL

## 2023-05-11 NOTE — DISCUSSION/SUMMARY
[FreeTextEntry1] : Discussed urine seen in office. UA dipstick deferred due to recent AZO use. Will be sending urine for cath UA and urine culture for further evaluation; will follow up with results and treat as needed. Pyridium eRx sent for urinary discomfort. \par Advised to increase hydration.\par Patient concerns addressed, questions answered. patient verbalized understanding.\par \par \par

## 2023-05-11 NOTE — HISTORY OF PRESENT ILLNESS
[FreeTextEntry1] : Pt is a 54yo F who presents to the office today for possible UTI. Pt reports since Thursday 5/4/23 has been experiencing dysuria, increase in urinary frequency, and pelvic pain. Pt denies any fever, chills or lower back pain. Pt was seen by Lakisha on 5/4/23 for PFPT and urine sample was collected.  Pt urine sample was contaminated and pt was sent Monurol on Friday 5/5/23. Pt states took Monurol, but is still feeling dysuria and pelvic pain. Pt has been taking OTC AZO for urinary discomfort.

## 2023-05-19 ENCOUNTER — TRANSCRIPTION ENCOUNTER (OUTPATIENT)
Age: 53
End: 2023-05-19

## 2023-05-22 ENCOUNTER — TRANSCRIPTION ENCOUNTER (OUTPATIENT)
Age: 53
End: 2023-05-22

## 2023-06-06 PROBLEM — N95.2 VAGINAL ATROPHY: Status: ACTIVE | Noted: 2023-06-06

## 2023-06-06 PROBLEM — N80.9 ENDOMETRIOSIS: Status: ACTIVE | Noted: 2022-09-17

## 2023-06-06 PROBLEM — R23.2 HOT FLASHES: Status: ACTIVE | Noted: 2023-06-06

## 2023-06-06 NOTE — PLAN
[FreeTextEntry1] : 53-year-old female with history of endometriosis and chronic pelvic pain.  Due to her chronic pelvic pain and history of endometriosis she opted for Lupron.  All risk, benefits and potential complications of Lupron reviewed with the patient.  We discussed potential side effects including menopausal symptoms.  Her hot flashes have gotten worse and info was given for relezin.  She started this medication 1 month ago but has not noticed any relief as of yet.  We discussed it can take 2 to 3 months for this medication to become fully effective.  She is also complaining of vaginal dryness with intercourse.  Info was given on lubricants to use during intercourse.  We discussed oil based versus water based versus silicone based lubricants.  She was also given information on the vaginal moisturizer, revaree.  She saw urogynecology and was diagnosed with vulvodynia and pelvic floor dysfunction.  She has been taking medications with some relief.  She started pelvic floor PT.  She has a history of bipolar, depression and anxiety.  She feels as if over the last month, her mental health has been more unstable.  She is following with her psychiatrist weekly.  After shared decision making, we decided not to give the sixth dose of Lupron.  We discussed that the side effect of Lupron is that it can but she went to menopause.  The patient states that her menopausal symptoms and mental health have been worse over the last month.  Do not see any benefit of getting the 6 dose of Lupron at this time.  Patient plans to continue with her psychiatrist.  She has an appointment in 1 week.  Information for the crisis center was given.  Call parameters were given.  Rx was given for FSH and estradiol.  She will return to the office in 4 weeks for a follow-up visit.\par \par

## 2023-06-06 NOTE — PLAN
[FreeTextEntry1] : 53-year-old female with\par \par 1.  Chronic pelvic pain and history of endometriosis.  The patient has completed her course of Lupron.  She got 5 out of the 6 doses of Lupron.  The last dose was not completed secondary to menopausal symptoms and changes in her mental health status.\par \par 2.  Pelvic floor dysfunction and vulvodynia.  Continue follow-up with urogynecology.  Continue medications as directed.  Continue pelvic floor physical therapy.\par \par 3.  Hot flashes.  The etiology of hot flashes was reviewed with the patient.  She has been taking relizen for 2 months with no relief.  We discussed other options for hot flashes.  We discussed that SSRIs can help with hot flashes.  We also discussed that she cannot take hormone replacement therapy secondary to her history of stroke/TIA.  She will discuss this further with her psychiatrist.  Advised the patient that her psychiatrist can call our office if she has any questions.\par \par 4.  Vaginal dryness.  The patient was counseled on lubrication's with intercourse.  We discussed oil-based versus silicone-based versus water-based lubricants.  We also discussed vaginal moisturizers.  Information was given on revaree.  She was aware that she will need to try this medication for 2 to 3 months.\par \par 5.  FSH and estradiol reviewed with the patient.  FSH 15.  Estradiol 8.  We discussed that these labs are most likely suggestive of perimenopause.\par \par The patient was given the opportunity to ask questions and all were answered to her satisfaction.  She will return to the office for a follow-up and well woman exam in August 2023.

## 2023-06-06 NOTE — HISTORY OF PRESENT ILLNESS
[FreeTextEntry1] : 53-year-old female presents for blood work results and follow up visit.  She has chronic pelvic pain and endometriosis.  She has a history of a supracervical hysterectomy for endometriosis in 2010.  She states for 4 years after the procedure she felt very good but then began having very bad pelvic pain.  She had 5 lupron injections and did not get the 6th secondary to menopausal symptoms and mental health issues.  Pelvi pain is improving.  She is taking naproxen as needed with good relief.  She is seeing urogynecology for vulvodynia and pelvic floor dysfunction.  She was started on an anti-inflammatory cream, amitriptyline/ketamine cream, and methocarbamol 3 times a day.  She states that she has seen an improvement in her pelvic pain since starting these medications and pelvic floor PT.  Prior to starting Lupron, she was unable to be sexually active.  She states she has had some improvement with intercourse.  \par \par She states over the past few months her menopausal symptoms have gotten much worse.  At her last visit, she complained of vaginal dryness.  We discussed different types of lubrication's.  She has tried them with some relief.  We also discussed vaginal moisturizers.  The patient states her hot flashes have been very bothersome.  She states she is taking relizen for the past 2 months with no relief.  The patient has a history of a stroke and TIA and cannot take hormones.  The patient also states that she follows with a psychiatrist every 2 weeks.  She states that her last visit she had noticed that her mental health was not as good as it is usually been.  She has a history of bipolar, depression and anxiety.  She is currently taking lamotrigine, risperidone, gabapentin and Zoloft.  Today, she denies any suicidal or homicidal ideation.  She states since her last visit, her mental health has improved.  She is now seeing her psychiatrist monthly.  \par

## 2023-06-06 NOTE — HISTORY OF PRESENT ILLNESS
[FreeTextEntry1] : 53-year-old female presents for sixth and final lupron injection.  She has chronic pelvic pain and endometriosis.  She has a history of a supracervical hysterectomy for endometriosis in 2010.  She states for 4 years after the procedure she felt very good but then began having very bad pelvic pain.  She states since her last lupron injection she has noticed a slight improvement in her pelvic pain.  She is taking naproxen as needed with good relief.  She saw urogynecology on February 1 and had an evaluation done.  She was started on an anti-inflammatory cream, amitriptyline/ketamine cream, and methocarbamol 3 times a day.  She states that she has seen an improvement in her pelvic pain since starting these medications.  She is started pelvic floor PT.  Prior to starting Lupron, she was unable to be sexually active.  She states she has had some improvement with intercourse.  She states over the past month her menopausal symptoms have gotten much worse.  At her last visit, she complained of vaginal dryness.  We discussed different types of lubrication's.  She has not yet started using lubrication.  We reviewed lubrications again.  We also discussed vaginal moisturizers.  The patient states her hot flashes have been very bothersome.  She states she is taking relizen for the past month with no relief.  The patient has a history of a stroke and TIA and cannot take hormones.  The patient also states that she follows with a psychiatrist every 2 weeks.  She states that her last visit she had noticed that her mental health was not as good as it is usually been.  She has a history of bipolar, depression and anxiety.  She is currently taking lamotrigine, risperidone, gabapentin and Zoloft.  Today, she denies any suicidal or homicidal ideation.  She states her psychiatrist is concerned about her and is now seeing her weekly.\par

## 2023-06-30 ENCOUNTER — APPOINTMENT (OUTPATIENT)
Dept: UROGYNECOLOGY | Facility: CLINIC | Age: 53
End: 2023-06-30
Payer: COMMERCIAL

## 2023-06-30 VITALS — DIASTOLIC BLOOD PRESSURE: 78 MMHG | SYSTOLIC BLOOD PRESSURE: 148 MMHG | TEMPERATURE: 98 F

## 2023-06-30 DIAGNOSIS — N32.81 OVERACTIVE BLADDER: ICD-10-CM

## 2023-06-30 DIAGNOSIS — N94.10 UNSPECIFIED DYSPAREUNIA: ICD-10-CM

## 2023-06-30 DIAGNOSIS — M79.18 MYALGIA, OTHER SITE: ICD-10-CM

## 2023-06-30 DIAGNOSIS — N39.46 MIXED INCONTINENCE: ICD-10-CM

## 2023-06-30 DIAGNOSIS — N81.9 FEMALE GENITAL PROLAPSE, UNSPECIFIED: ICD-10-CM

## 2023-06-30 DIAGNOSIS — R10.2 PELVIC AND PERINEAL PAIN: ICD-10-CM

## 2023-06-30 DIAGNOSIS — N94.819 VULVODYNIA, UNSPECIFIED: ICD-10-CM

## 2023-06-30 DIAGNOSIS — G89.29 PELVIC AND PERINEAL PAIN: ICD-10-CM

## 2023-06-30 PROCEDURE — 99214 OFFICE O/P EST MOD 30 MIN: CPT

## 2023-06-30 RX ORDER — FLUOXETINE HYDROCHLORIDE 10 MG/1
10 CAPSULE ORAL
Refills: 0 | Status: ACTIVE | COMMUNITY
Start: 2023-06-30

## 2023-06-30 RX ORDER — LIRAGLUTIDE 6 MG/ML
18 INJECTION, SOLUTION SUBCUTANEOUS
Refills: 0 | Status: ACTIVE | COMMUNITY
Start: 2023-06-30

## 2023-06-30 RX ORDER — FOSFOMYCIN TROMETHAMINE 3 G/1
3 POWDER ORAL ONCE
Qty: 1 | Refills: 0 | Status: COMPLETED | COMMUNITY
Start: 2023-05-05 | End: 2023-06-30

## 2023-06-30 NOTE — DISCUSSION/SUMMARY
[FreeTextEntry1] : Increase topical AK to 5/5% 1-2x/day. \par Continue Traumeel 10% cream QD-BID as using. Suggested she apply after intercourse. \par Continue methocarbamol 750mg BID. \par Resume PFPT with Lakisha weekly. \par Continue HEP, IM/MFR with josefa.\par Encouraged warm baths daily with Epsom or magnesium salt or Aveeno oatmeal.\par \par Told Christina to f/u with neuro for enuresis.\par f/u with Dr. Conner for POP, OAB, UUI as scheduled (end August).\par \par RTO 3 months

## 2023-06-30 NOTE — HISTORY OF PRESENT ILLNESS
[FreeTextEntry1] : Christina is here for a f/u visit.\par \par She is seeing Lakisha for PFPT weekly but hasn't been in about a month 2/2 scheduling issues. \par Using topical Traumeel and AK 2/2% to vulva. She thinks the topical creams are starting to help. \par She is taking methocarbamol 750mg BID with good results. When she has increased pelvic spasms/pain, she notes that methocarbamol helps to mitigate her symptoms.\par She is doing exercises and using wand at home. \par \par She has had intercourse with continues pain/burning with initial penetration, during and afterwards. \par \par She denies bladder pain (with filling, spasms, etc). \par She endorses daytime frequency x 5-6, nocturia x 1-2. She continues to endorse UUI, marsha if she holds her urine too long.\par No dysuria, hematuria, RUTIs. All cultures have proven negative. \par No further episodes of enuresis.\par Denies UTI symptoms: no dysuria, hematuria, fever, chills, n/v CVAT. \par \par She has psychiatrist whom she sees regularly. \par She was just started on prozac 10mg po QD.\par She is starting Saxenda for weight loss next week.\par \par Christina has an appointment with Dr. Conner end of August.

## 2023-06-30 NOTE — PHYSICAL EXAM
[No Acute Distress] : in no acute distress [Well developed] : well developed [Well Nourished] : ~L well nourished [Good Hygeine] : demonstrates good hygeine [Oriented x3] : oriented to person, place, and time [Normal Memory] : ~T memory was ~L unimpaired [Normal Mood/Affect] : mood and affect are normal [No Edema] : ~T edema was not present [None] : no CVA tenderness [Obese] : obese [Normal Gait] : gait was normal [No Lesions] : no lesions were seen on the external genitalia [Vulvar Atrophy] : vulvar atrophy [Labia Majora] : were normal [Labia Minora] : were normal [Atrophy] : atrophy [Rectocele] : a rectocele [Cystocele] : a cystocele [No Bleeding] : there was no active vaginal bleeding [Normal] : no abnormalities [Exam Deferred] : was deferred [Tenderness] : ~T no ~M abdominal tenderness observed [Distended] : not distended [de-identified] : Q-tip touch test 7/10 @ 1,11 and 5/10 @ 5,6,7. Paleness at introitus with reactive erythema at ostia. No fissures, ulcerations, erosions. [FreeTextEntry3] : hypermobile urethra [FreeTextEntry4] : PFMs 2/4 with MTrPs b/l levators, all groups and b/l OI. Exquisite pain to palpation

## 2023-09-11 ENCOUNTER — INPATIENT (INPATIENT)
Facility: HOSPITAL | Age: 53
LOS: 3 days | Discharge: HOME | DRG: 62 | End: 2023-09-15
Attending: STUDENT IN AN ORGANIZED HEALTH CARE EDUCATION/TRAINING PROGRAM | Admitting: PSYCHIATRY & NEUROLOGY
Payer: COMMERCIAL

## 2023-09-11 ENCOUNTER — TRANSCRIPTION ENCOUNTER (OUTPATIENT)
Age: 53
End: 2023-09-11

## 2023-09-11 VITALS
OXYGEN SATURATION: 95 % | RESPIRATION RATE: 18 BRPM | SYSTOLIC BLOOD PRESSURE: 152 MMHG | DIASTOLIC BLOOD PRESSURE: 85 MMHG | HEIGHT: 61 IN | TEMPERATURE: 98 F | HEART RATE: 63 BPM

## 2023-09-11 DIAGNOSIS — Z90.711 ACQUIRED ABSENCE OF UTERUS WITH REMAINING CERVICAL STUMP: Chronic | ICD-10-CM

## 2023-09-11 DIAGNOSIS — R09.89 OTHER SPECIFIED SYMPTOMS AND SIGNS INVOLVING THE CIRCULATORY AND RESPIRATORY SYSTEMS: ICD-10-CM

## 2023-09-11 LAB
ALBUMIN SERPL ELPH-MCNC: 4.4 G/DL — SIGNIFICANT CHANGE UP (ref 3.3–5.2)
ALP SERPL-CCNC: 89 U/L — SIGNIFICANT CHANGE UP (ref 40–120)
ALT FLD-CCNC: 18 U/L — SIGNIFICANT CHANGE UP
ANION GAP SERPL CALC-SCNC: 14 MMOL/L — SIGNIFICANT CHANGE UP (ref 5–17)
APPEARANCE UR: CLEAR — SIGNIFICANT CHANGE UP
APTT BLD: 32.2 SEC — SIGNIFICANT CHANGE UP (ref 24.5–35.6)
AST SERPL-CCNC: 23 U/L — SIGNIFICANT CHANGE UP
BACTERIA # UR AUTO: ABNORMAL
BASOPHILS # BLD AUTO: 0.06 K/UL — SIGNIFICANT CHANGE UP (ref 0–0.2)
BASOPHILS NFR BLD AUTO: 0.8 % — SIGNIFICANT CHANGE UP (ref 0–2)
BILIRUB SERPL-MCNC: <0.2 MG/DL — LOW (ref 0.4–2)
BILIRUB UR-MCNC: NEGATIVE — SIGNIFICANT CHANGE UP
BUN SERPL-MCNC: 14.6 MG/DL — SIGNIFICANT CHANGE UP (ref 8–20)
CALCIUM SERPL-MCNC: 10.3 MG/DL — SIGNIFICANT CHANGE UP (ref 8.4–10.5)
CHLORIDE SERPL-SCNC: 98 MMOL/L — SIGNIFICANT CHANGE UP (ref 96–108)
CO2 SERPL-SCNC: 24 MMOL/L — SIGNIFICANT CHANGE UP (ref 22–29)
COLOR SPEC: YELLOW — SIGNIFICANT CHANGE UP
CREAT SERPL-MCNC: 0.87 MG/DL — SIGNIFICANT CHANGE UP (ref 0.5–1.3)
DIFF PNL FLD: ABNORMAL
EGFR: 80 ML/MIN/1.73M2 — SIGNIFICANT CHANGE UP
EOSINOPHIL # BLD AUTO: 0.13 K/UL — SIGNIFICANT CHANGE UP (ref 0–0.5)
EOSINOPHIL NFR BLD AUTO: 1.7 % — SIGNIFICANT CHANGE UP (ref 0–6)
EPI CELLS # UR: SIGNIFICANT CHANGE UP
GLUCOSE BLDC GLUCOMTR-MCNC: 101 MG/DL — HIGH (ref 70–99)
GLUCOSE BLDC GLUCOMTR-MCNC: 96 MG/DL — SIGNIFICANT CHANGE UP (ref 70–99)
GLUCOSE SERPL-MCNC: 97 MG/DL — SIGNIFICANT CHANGE UP (ref 70–99)
GLUCOSE UR QL: NEGATIVE MG/DL — SIGNIFICANT CHANGE UP
HCG SERPL-ACNC: 5.1 MIU/ML — SIGNIFICANT CHANGE UP
HCT VFR BLD CALC: 41.7 % — SIGNIFICANT CHANGE UP (ref 34.5–45)
HGB BLD-MCNC: 13.9 G/DL — SIGNIFICANT CHANGE UP (ref 11.5–15.5)
IMM GRANULOCYTES NFR BLD AUTO: 0.3 % — SIGNIFICANT CHANGE UP (ref 0–0.9)
INR BLD: 0.99 RATIO — SIGNIFICANT CHANGE UP (ref 0.85–1.18)
KETONES UR-MCNC: NEGATIVE — SIGNIFICANT CHANGE UP
LEUKOCYTE ESTERASE UR-ACNC: NEGATIVE — SIGNIFICANT CHANGE UP
LYMPHOCYTES # BLD AUTO: 2.06 K/UL — SIGNIFICANT CHANGE UP (ref 1–3.3)
LYMPHOCYTES # BLD AUTO: 26.7 % — SIGNIFICANT CHANGE UP (ref 13–44)
MCHC RBC-ENTMCNC: 29.9 PG — SIGNIFICANT CHANGE UP (ref 27–34)
MCHC RBC-ENTMCNC: 33.3 GM/DL — SIGNIFICANT CHANGE UP (ref 32–36)
MCV RBC AUTO: 89.7 FL — SIGNIFICANT CHANGE UP (ref 80–100)
MONOCYTES # BLD AUTO: 0.53 K/UL — SIGNIFICANT CHANGE UP (ref 0–0.9)
MONOCYTES NFR BLD AUTO: 6.9 % — SIGNIFICANT CHANGE UP (ref 2–14)
MRSA PCR RESULT.: SIGNIFICANT CHANGE UP
NEUTROPHILS # BLD AUTO: 4.91 K/UL — SIGNIFICANT CHANGE UP (ref 1.8–7.4)
NEUTROPHILS NFR BLD AUTO: 63.6 % — SIGNIFICANT CHANGE UP (ref 43–77)
NITRITE UR-MCNC: NEGATIVE — SIGNIFICANT CHANGE UP
PH UR: 5 — SIGNIFICANT CHANGE UP (ref 5–8)
PLATELET # BLD AUTO: 307 K/UL — SIGNIFICANT CHANGE UP (ref 150–400)
POTASSIUM SERPL-MCNC: 4 MMOL/L — SIGNIFICANT CHANGE UP (ref 3.5–5.3)
POTASSIUM SERPL-SCNC: 4 MMOL/L — SIGNIFICANT CHANGE UP (ref 3.5–5.3)
PROT SERPL-MCNC: 7.3 G/DL — SIGNIFICANT CHANGE UP (ref 6.6–8.7)
PROT UR-MCNC: NEGATIVE — SIGNIFICANT CHANGE UP
PROTHROM AB SERPL-ACNC: 11 SEC — SIGNIFICANT CHANGE UP (ref 9.5–13)
RBC # BLD: 4.65 M/UL — SIGNIFICANT CHANGE UP (ref 3.8–5.2)
RBC # FLD: 12.9 % — SIGNIFICANT CHANGE UP (ref 10.3–14.5)
RBC CASTS # UR COMP ASSIST: SIGNIFICANT CHANGE UP /HPF (ref 0–4)
S AUREUS DNA NOSE QL NAA+PROBE: SIGNIFICANT CHANGE UP
SODIUM SERPL-SCNC: 136 MMOL/L — SIGNIFICANT CHANGE UP (ref 135–145)
SP GR SPEC: 1.01 — SIGNIFICANT CHANGE UP (ref 1.01–1.02)
TROPONIN T SERPL-MCNC: <0.01 NG/ML — SIGNIFICANT CHANGE UP (ref 0–0.06)
UROBILINOGEN FLD QL: NEGATIVE MG/DL — SIGNIFICANT CHANGE UP
WBC # BLD: 7.71 K/UL — SIGNIFICANT CHANGE UP (ref 3.8–10.5)
WBC # FLD AUTO: 7.71 K/UL — SIGNIFICANT CHANGE UP (ref 3.8–10.5)
WBC UR QL: SIGNIFICANT CHANGE UP /HPF (ref 0–5)

## 2023-09-11 PROCEDURE — 99291 CRITICAL CARE FIRST HOUR: CPT

## 2023-09-11 PROCEDURE — 0042T: CPT | Mod: MA

## 2023-09-11 PROCEDURE — 70496 CT ANGIOGRAPHY HEAD: CPT | Mod: 26,MA

## 2023-09-11 PROCEDURE — 70498 CT ANGIOGRAPHY NECK: CPT | Mod: 26,MA

## 2023-09-11 PROCEDURE — 99223 1ST HOSP IP/OBS HIGH 75: CPT

## 2023-09-11 PROCEDURE — 93010 ELECTROCARDIOGRAM REPORT: CPT

## 2023-09-11 PROCEDURE — 70450 CT HEAD/BRAIN W/O DYE: CPT | Mod: 26,59,MA

## 2023-09-11 RX ORDER — DEXTROSE 50 % IN WATER 50 %
25 SYRINGE (ML) INTRAVENOUS ONCE
Refills: 0 | Status: DISCONTINUED | OUTPATIENT
Start: 2023-09-11 | End: 2023-09-12

## 2023-09-11 RX ORDER — SODIUM CHLORIDE 9 MG/ML
10 INJECTION INTRAMUSCULAR; INTRAVENOUS; SUBCUTANEOUS ONCE
Refills: 0 | Status: COMPLETED | OUTPATIENT
Start: 2023-09-11 | End: 2023-09-11

## 2023-09-11 RX ORDER — DIVALPROEX SODIUM 500 MG/1
750 TABLET, DELAYED RELEASE ORAL AT BEDTIME
Refills: 0 | Status: DISCONTINUED | OUTPATIENT
Start: 2023-09-11 | End: 2023-09-12

## 2023-09-11 RX ORDER — TENECTEPLASE 50 MG
23 KIT INTRAVENOUS ONCE
Refills: 0 | Status: COMPLETED | OUTPATIENT
Start: 2023-09-11 | End: 2023-09-11

## 2023-09-11 RX ORDER — ASPIRIN/CALCIUM CARB/MAGNESIUM 324 MG
300 TABLET ORAL DAILY
Refills: 0 | Status: DISCONTINUED | OUTPATIENT
Start: 2023-09-11 | End: 2023-09-11

## 2023-09-11 RX ORDER — SODIUM CHLORIDE 9 MG/ML
1000 INJECTION INTRAMUSCULAR; INTRAVENOUS; SUBCUTANEOUS
Refills: 0 | Status: DISCONTINUED | OUTPATIENT
Start: 2023-09-11 | End: 2023-09-12

## 2023-09-11 RX ORDER — ACETAMINOPHEN 500 MG
1000 TABLET ORAL ONCE
Refills: 0 | Status: COMPLETED | OUTPATIENT
Start: 2023-09-11 | End: 2023-09-11

## 2023-09-11 RX ORDER — DEXTROSE 50 % IN WATER 50 %
12.5 SYRINGE (ML) INTRAVENOUS ONCE
Refills: 0 | Status: DISCONTINUED | OUTPATIENT
Start: 2023-09-11 | End: 2023-09-12

## 2023-09-11 RX ORDER — SERTRALINE 25 MG/1
100 TABLET, FILM COATED ORAL DAILY
Refills: 0 | Status: DISCONTINUED | OUTPATIENT
Start: 2023-09-11 | End: 2023-09-12

## 2023-09-11 RX ORDER — CHLORHEXIDINE GLUCONATE 213 G/1000ML
1 SOLUTION TOPICAL
Refills: 0 | Status: DISCONTINUED | OUTPATIENT
Start: 2023-09-11 | End: 2023-09-15

## 2023-09-11 RX ORDER — RISPERIDONE 4 MG/1
3 TABLET ORAL
Refills: 0 | Status: DISCONTINUED | OUTPATIENT
Start: 2023-09-11 | End: 2023-09-12

## 2023-09-11 RX ORDER — INSULIN LISPRO 100/ML
VIAL (ML) SUBCUTANEOUS EVERY 6 HOURS
Refills: 0 | Status: DISCONTINUED | OUTPATIENT
Start: 2023-09-11 | End: 2023-09-12

## 2023-09-11 RX ADMIN — SODIUM CHLORIDE 10 MILLILITER(S): 9 INJECTION INTRAMUSCULAR; INTRAVENOUS; SUBCUTANEOUS at 11:01

## 2023-09-11 RX ADMIN — Medication 400 MILLIGRAM(S): at 17:49

## 2023-09-11 RX ADMIN — Medication 1000 MILLIGRAM(S): at 18:49

## 2023-09-11 RX ADMIN — Medication 1000 MILLIGRAM(S): at 23:23

## 2023-09-11 RX ADMIN — TENECTEPLASE 3312 MILLIGRAM(S): KIT at 11:01

## 2023-09-11 RX ADMIN — SODIUM CHLORIDE 50 MILLILITER(S): 9 INJECTION INTRAMUSCULAR; INTRAVENOUS; SUBCUTANEOUS at 14:00

## 2023-09-11 RX ADMIN — Medication 400 MILLIGRAM(S): at 23:08

## 2023-09-11 NOTE — ED PROVIDER NOTE - OBJECTIVE STATEMENT
53-year-old female past medical history including hypertension, hyperlipidemia, anxiety, depression presenting for right-sided weakness and strength associated with left facial droop. Symptoms started at 9 AM this morning.   Symptoms have not resolved.  Patient denies any injury intracranial pathology or blood thinners. When discussing recorded allergies patient said she had previously been anaphylactic to IV contrast, but reports that has resolved.  Reports IV contrast with CT last year which she did not have a reaction to it.

## 2023-09-11 NOTE — ED PROVIDER NOTE - PHYSICAL EXAMINATION
General: well appearing, NAD  Head: NC, AT  EENT: EOMI, no scleral icterus  Cardiac: RRR, no apparent murmurs, no lower extremity edema  Respiratory: CTABL, no respiratory distress   Abdomen: soft, ND, NT, nonperitonitic  MSK/Vascular: full ROM, soft compartments, warm extremities  Neuro: AAOx3, sensation to light touch intact on left UE and LE intact. Decreased sensation on RLE and RUE. Drift on RLE. No Drift on LUE, LLE, RUE. Facial droop on right. FTN WNL.  Psych: calm, cooperative
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
## 2023-09-11 NOTE — CONSULT NOTE ADULT - CRITICAL CARE ATTENDING COMMENT
I spent 65 minutes of critical care time examining patient, reviewing vitals, labs, medications, imaging and discussing with the team goals of care to prevent life-threatening in this patient who is at high risk for deterioration or death due to:  stroke     53y Female w/ noted/reported PMhx of asthma, pseudoseizures, biPolar disorder, htn, syncope, former nicotine, HTN, partial hysterectomy, and reported hx of TIA/CVA who presented with right sided weakness and left facial palsy for which a stroke code was called and TNK was given.     Improving exam after TNK  neurointact except mild L facial droop    post-TNK protocol  CTH in 24h  PT/OT  pain mgt

## 2023-09-11 NOTE — ED PROVIDER NOTE - PROGRESS NOTE DETAILS
Citarrella: Patient confirms that she had IV contrast 1 year ago without incident - she is no longer allergic to IV contrast and does not need pre-medication.

## 2023-09-11 NOTE — ED PROVIDER NOTE - NIH STROKE SCALE: 9. BEST LANGUAGE, QM
Silver Nitrate Text: The wound bed was treated with silver nitrate after the biopsy was performed. (0) No aphasia; normal

## 2023-09-11 NOTE — PATIENT PROFILE ADULT - FALL HARM RISK - RISK INTERVENTIONS

## 2023-09-11 NOTE — CONSULT NOTE ADULT - NS ATTEND AMEND GEN_ALL_CORE FT
Seen and examined with the ACP team. Plan discussed with ACPs.    I agree with assessment and plan  as written with modifications made above.    will follow with you    Magan Albarado MD PhD   [phone]

## 2023-09-11 NOTE — H&P ADULT - CRITICAL CARE ATTENDING COMMENT
Patient seen and examined. Agree with above assessment and plan.    53y Female w/ noted/reported PMhx of asthma, pseudoseizures, biPolar disorder, htn, syncope, former nicotine, HTN, partial hysterectomy, and reported hx of TIA/CVA who presented with right sided weakness.     Mild R facial droop    post-TNK protocol

## 2023-09-11 NOTE — DISCHARGE NOTE NURSING/CASE MANAGEMENT/SOCIAL WORK - PATIENT PORTAL LINK FT
You can access the FollowMyHealth Patient Portal offered by [facility] by registering at the following website: [email or web address]. By joining [facility]’s FollowMyHealth portal, you will also be able to view your health information using other applications (apps) compatible with our system.

## 2023-09-11 NOTE — ED PROVIDER NOTE - CLINICAL SUMMARY MEDICAL DECISION MAKING FREE TEXT BOX
Pt presenting for stroke. Code stroke called. Symptoms not improving. No contraindications. Neuro consulted. TNK given.

## 2023-09-11 NOTE — ED PROVIDER NOTE - ATTENDING CONTRIBUTION TO CARE
I, Starla Natarajan DO, have personally provided 45 minutes of critical care time exclusive of time spent on separately billable procedures. Time includes review of laboratory data, radiology results, discussion with consultants, and monitoring for potential decompensation. Interventions were performed as documented above.     I personally saw the patient with the resident, and completed the key components of the history and physical exam. I then discussed the management plan with the resident.    54 y/o F with PMH HTN, HLD, anxiety presents with right sided weakness and left facial droop last normal at 0900.     I agree with exam as documented.    Patient with stroke-like symptoms, within window for TNK - TNK pushed - will admit to NeuroICU.

## 2023-09-11 NOTE — CONSULT NOTE ADULT - ASSESSMENT
This is a  53y Female w/ noted/reported PMhx of asthma, pseudoseizures, biPolar disorder, htn, syncope, former nicotine, HTN, partial hysterectomy, and reported hx of TIA/CVA who presented with right sided weakness and left facial palsy for which a stroke code was called and TNK was given.     Neuro:  #Bipolar disorder  #Documented hx of non-epileptic seizures   - Q1h Neuro checks post TNK  for first 24 hours   - F/u MRI Brain w/o      Cardiovascular:  - Permissive HTN; overall < 180/105mmHg   - titrate statin to LDL goal less than 70  - F/u TTE     Respiratory  - Ness air, no active issues    Renal  - Na Goal 134-145  - No active issues    GI  - NPO failed initial bedside speech and swallow  - Will repeat bedside swallow at 7pm since neuro deficits have improved    ID  - Afebrile, no active signs of infection    Heme  -ANTITHROMBOTIC THERAPY: on hold post tenecteplase, pending findings of 24 hour post Tenectepalse protocol CT head vs. MRI   - DVT PPx: SCDs This is a  53y Female w/ noted/reported PMhx of asthma, pseudoseizures, biPolar disorder, htn, syncope, former nicotine, HTN, partial hysterectomy, and reported hx of TIA/CVA who presented with right sided weakness and left facial palsy for which a stroke code was called and TNK was given.     Neuro:  #Bipolar disorder  #Documented hx of non-epileptic seizures   - Q1h Neuro checks post TNK  for first 24 hours   - F/u MRI Brain w/o    - resume psych meds once patient passes swallow    Cardiovascular:  - Permissive HTN; overall < 180/105mmHg   - titrate statin to LDL goal less than 70  - F/u TTE     Respiratory  - Room air, no active issues    Renal  - Na Goal 134-145  - No active issues    GI  - NPO failed initial bedside speech and swallow  - Will repeat bedside swallow at 7pm since neuro deficits have improved    ID  - Afebrile, no active signs of infection    Heme  -ANTITHROMBOTIC THERAPY: on hold post tenecteplase, pending findings of 24 hour post Tenectepalse protocol CT head vs. MRI     PPX:  - DVT PPx: SCDs
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
## 2023-09-11 NOTE — DISCHARGE NOTE NURSING/CASE MANAGEMENT/SOCIAL WORK - NSDCPEFALRISK_GEN_ALL_CORE
For information on Fall & Injury Prevention, visit: [email or web address] OR  [email or web address] OR  https://www.cdc.gov/steadi/patient.html

## 2023-09-11 NOTE — H&P ADULT - NSICDXFAMILYHX_GEN_ALL_CORE_FT
FAMILY HISTORY:  FH: HTN (hypertension), mother and father  FH: hyperlipidemia, mother and father
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
## 2023-09-11 NOTE — ED PROVIDER NOTE - NS ED ROS FT
Constitutional: no fever, no chills  Head: NC, AT   Eyes: no redness   ENMT: no nasal congestion/drainage, no sore throat   CV: no chest pain, no edema  Resp: no cough, no dyspnea  GI: no abdominal pain, no nausea, no vomiting, no diarrhea  : no dysuria, no hematuria   Skin: no lesions, no rashes   Neuro: no LOC, no headache, +sensory deficits, +weakness

## 2023-09-11 NOTE — ED ADULT NURSE NOTE - OBJECTIVE STATEMENT
Pt arrives to ED c/o sudden onset of R sided weakness / slurred speech and numbness started approx 9 am . Hx of TIA's Pt arrives to ED c/o sudden onset of R sided weakness / slurred speech and numbness started approx 9 am . Hx of TIA's. Code stroke activated
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
## 2023-09-11 NOTE — ED ADULT NURSE NOTE - NSFALLUNIVINTERV_ED_ALL_ED
Bed/Stretcher in lowest position, wheels locked, appropriate side rails in place/Call bell, personal items and telephone in reach/Instruct patient to call for assistance before getting out of bed/chair/stretcher/Non-slip footwear applied when patient is off stretcher/[place] to call system/Physically safe environment - no spills, clutter or unnecessary equipment/Purposeful proactive rounding/Room/bathroom lighting operational, light cord in reach

## 2023-09-12 LAB
A1C WITH ESTIMATED AVERAGE GLUCOSE RESULT: 5.1 % — SIGNIFICANT CHANGE UP (ref 4–5.6)
ALBUMIN SERPL ELPH-MCNC: 4.4 G/DL — SIGNIFICANT CHANGE UP (ref 3.3–5.2)
ALP SERPL-CCNC: 89 U/L — SIGNIFICANT CHANGE UP (ref 40–120)
ALT FLD-CCNC: 16 U/L — SIGNIFICANT CHANGE UP
ANION GAP SERPL CALC-SCNC: 13 MMOL/L — SIGNIFICANT CHANGE UP (ref 5–17)
AST SERPL-CCNC: 18 U/L — SIGNIFICANT CHANGE UP
BILIRUB DIRECT SERPL-MCNC: 0.1 MG/DL — SIGNIFICANT CHANGE UP (ref 0–0.3)
BILIRUB INDIRECT FLD-MCNC: 0.2 MG/DL — SIGNIFICANT CHANGE UP (ref 0.2–1)
BILIRUB SERPL-MCNC: 0.2 MG/DL — LOW (ref 0.4–2)
BUN SERPL-MCNC: 13.6 MG/DL — SIGNIFICANT CHANGE UP (ref 8–20)
CALCIUM SERPL-MCNC: 9.8 MG/DL — SIGNIFICANT CHANGE UP (ref 8.4–10.5)
CHLORIDE SERPL-SCNC: 102 MMOL/L — SIGNIFICANT CHANGE UP (ref 96–108)
CHOLEST SERPL-MCNC: 284 MG/DL — HIGH
CO2 SERPL-SCNC: 24 MMOL/L — SIGNIFICANT CHANGE UP (ref 22–29)
CREAT SERPL-MCNC: 0.78 MG/DL — SIGNIFICANT CHANGE UP (ref 0.5–1.3)
EGFR: 91 ML/MIN/1.73M2 — SIGNIFICANT CHANGE UP
ESTIMATED AVERAGE GLUCOSE: 100 MG/DL — SIGNIFICANT CHANGE UP (ref 68–114)
GLUCOSE BLDC GLUCOMTR-MCNC: 94 MG/DL — SIGNIFICANT CHANGE UP (ref 70–99)
GLUCOSE SERPL-MCNC: 93 MG/DL — SIGNIFICANT CHANGE UP (ref 70–99)
HCT VFR BLD CALC: 41.7 % — SIGNIFICANT CHANGE UP (ref 34.5–45)
HDLC SERPL-MCNC: 74 MG/DL — SIGNIFICANT CHANGE UP
HGB BLD-MCNC: 13.7 G/DL — SIGNIFICANT CHANGE UP (ref 11.5–15.5)
LIPID PNL WITH DIRECT LDL SERPL: 182 MG/DL — HIGH
MCHC RBC-ENTMCNC: 29.3 PG — SIGNIFICANT CHANGE UP (ref 27–34)
MCHC RBC-ENTMCNC: 32.9 GM/DL — SIGNIFICANT CHANGE UP (ref 32–36)
MCV RBC AUTO: 89.3 FL — SIGNIFICANT CHANGE UP (ref 80–100)
NON HDL CHOLESTEROL: 210 MG/DL — HIGH
PLATELET # BLD AUTO: 288 K/UL — SIGNIFICANT CHANGE UP (ref 150–400)
POTASSIUM SERPL-MCNC: 3.5 MMOL/L — SIGNIFICANT CHANGE UP (ref 3.5–5.3)
POTASSIUM SERPL-SCNC: 3.5 MMOL/L — SIGNIFICANT CHANGE UP (ref 3.5–5.3)
PROT SERPL-MCNC: 7.3 G/DL — SIGNIFICANT CHANGE UP (ref 6.6–8.7)
RBC # BLD: 4.67 M/UL — SIGNIFICANT CHANGE UP (ref 3.8–5.2)
RBC # FLD: 12.7 % — SIGNIFICANT CHANGE UP (ref 10.3–14.5)
SODIUM SERPL-SCNC: 139 MMOL/L — SIGNIFICANT CHANGE UP (ref 135–145)
TRIGL SERPL-MCNC: 140 MG/DL — SIGNIFICANT CHANGE UP
TSH SERPL-MCNC: 1.38 UIU/ML — SIGNIFICANT CHANGE UP (ref 0.27–4.2)
WBC # BLD: 5.48 K/UL — SIGNIFICANT CHANGE UP (ref 3.8–10.5)
WBC # FLD AUTO: 5.48 K/UL — SIGNIFICANT CHANGE UP (ref 3.8–10.5)

## 2023-09-12 PROCEDURE — 70450 CT HEAD/BRAIN W/O DYE: CPT | Mod: 26

## 2023-09-12 PROCEDURE — 99233 SBSQ HOSP IP/OBS HIGH 50: CPT

## 2023-09-12 PROCEDURE — 99223 1ST HOSP IP/OBS HIGH 75: CPT

## 2023-09-12 RX ORDER — RISPERIDONE 4 MG/1
3 TABLET ORAL AT BEDTIME
Refills: 0 | Status: DISCONTINUED | OUTPATIENT
Start: 2023-09-12 | End: 2023-09-15

## 2023-09-12 RX ORDER — POLYETHYLENE GLYCOL 3350 17 G/17G
17 POWDER, FOR SOLUTION ORAL DAILY
Refills: 0 | Status: DISCONTINUED | OUTPATIENT
Start: 2023-09-12 | End: 2023-09-15

## 2023-09-12 RX ORDER — AMLODIPINE BESYLATE 2.5 MG/1
1 TABLET ORAL
Refills: 0 | DISCHARGE

## 2023-09-12 RX ORDER — ATORVASTATIN CALCIUM 80 MG/1
80 TABLET, FILM COATED ORAL AT BEDTIME
Refills: 0 | Status: DISCONTINUED | OUTPATIENT
Start: 2023-09-12 | End: 2023-09-15

## 2023-09-12 RX ORDER — ASPIRIN/CALCIUM CARB/MAGNESIUM 324 MG
81 TABLET ORAL DAILY
Refills: 0 | Status: DISCONTINUED | OUTPATIENT
Start: 2023-09-12 | End: 2023-09-15

## 2023-09-12 RX ORDER — LAMOTRIGINE 25 MG/1
0 TABLET, ORALLY DISINTEGRATING ORAL
Qty: 0 | Refills: 0 | DISCHARGE

## 2023-09-12 RX ORDER — GALCANEZUMAB 100 MG/ML
120 INJECTION, SOLUTION SUBCUTANEOUS
Refills: 0 | DISCHARGE

## 2023-09-12 RX ORDER — RISPERIDONE 4 MG/1
1 TABLET ORAL
Qty: 0 | Refills: 0 | DISCHARGE

## 2023-09-12 RX ORDER — DIVALPROEX SODIUM 500 MG/1
3 TABLET, DELAYED RELEASE ORAL
Qty: 0 | Refills: 0 | DISCHARGE

## 2023-09-12 RX ORDER — GABAPENTIN 400 MG/1
1 CAPSULE ORAL
Refills: 0 | DISCHARGE

## 2023-09-12 RX ORDER — ATORVASTATIN CALCIUM 80 MG/1
1 TABLET, FILM COATED ORAL
Refills: 0 | DISCHARGE

## 2023-09-12 RX ORDER — MEPOLIZUMAB 100 MG/ML
0 INJECTION, SOLUTION SUBCUTANEOUS
Qty: 0 | Refills: 0 | DISCHARGE

## 2023-09-12 RX ORDER — LAMOTRIGINE 25 MG/1
200 TABLET, ORALLY DISINTEGRATING ORAL EVERY 12 HOURS
Refills: 0 | Status: DISCONTINUED | OUTPATIENT
Start: 2023-09-12 | End: 2023-09-15

## 2023-09-12 RX ORDER — PANTOPRAZOLE SODIUM 20 MG/1
1 TABLET, DELAYED RELEASE ORAL
Refills: 0 | DISCHARGE

## 2023-09-12 RX ORDER — GALCANEZUMAB 100 MG/ML
300 INJECTION, SOLUTION SUBCUTANEOUS
Refills: 0 | DISCHARGE

## 2023-09-12 RX ORDER — FLUOXETINE HCL 10 MG
1 CAPSULE ORAL
Refills: 0 | DISCHARGE

## 2023-09-12 RX ORDER — SERTRALINE 25 MG/1
1.5 TABLET, FILM COATED ORAL
Refills: 0 | DISCHARGE

## 2023-09-12 RX ORDER — RISPERIDONE 4 MG/1
0 TABLET ORAL
Refills: 0 | DISCHARGE

## 2023-09-12 RX ORDER — GABAPENTIN 400 MG/1
0 CAPSULE ORAL
Refills: 0 | DISCHARGE

## 2023-09-12 RX ORDER — PREGABALIN 225 MG/1
50 CAPSULE ORAL
Qty: 0 | Refills: 0 | DISCHARGE

## 2023-09-12 RX ORDER — METHOCARBAMOL 500 MG/1
500 TABLET, FILM COATED ORAL THREE TIMES A DAY
Refills: 0 | Status: DISCONTINUED | OUTPATIENT
Start: 2023-09-12 | End: 2023-09-15

## 2023-09-12 RX ORDER — GABAPENTIN 400 MG/1
300 CAPSULE ORAL THREE TIMES A DAY
Refills: 0 | Status: DISCONTINUED | OUTPATIENT
Start: 2023-09-12 | End: 2023-09-15

## 2023-09-12 RX ORDER — LAMOTRIGINE 25 MG/1
1 TABLET, ORALLY DISINTEGRATING ORAL
Refills: 0 | DISCHARGE

## 2023-09-12 RX ORDER — POTASSIUM CHLORIDE 20 MEQ
40 PACKET (EA) ORAL ONCE
Refills: 0 | Status: COMPLETED | OUTPATIENT
Start: 2023-09-12 | End: 2023-09-12

## 2023-09-12 RX ORDER — ASPIRIN/CALCIUM CARB/MAGNESIUM 324 MG
1 TABLET ORAL
Qty: 0 | Refills: 0 | DISCHARGE

## 2023-09-12 RX ORDER — ENOXAPARIN SODIUM 100 MG/ML
40 INJECTION SUBCUTANEOUS
Refills: 0 | Status: DISCONTINUED | OUTPATIENT
Start: 2023-09-12 | End: 2023-09-15

## 2023-09-12 RX ORDER — RISPERIDONE 4 MG/1
2 TABLET ORAL DAILY
Refills: 0 | Status: DISCONTINUED | OUTPATIENT
Start: 2023-09-12 | End: 2023-09-15

## 2023-09-12 RX ORDER — MULTIVIT-MIN/FERROUS GLUCONATE 9 MG/15 ML
1 LIQUID (ML) ORAL
Qty: 0 | Refills: 0 | DISCHARGE

## 2023-09-12 RX ORDER — SENNA PLUS 8.6 MG/1
2 TABLET ORAL AT BEDTIME
Refills: 0 | Status: DISCONTINUED | OUTPATIENT
Start: 2023-09-12 | End: 2023-09-15

## 2023-09-12 RX ORDER — ACETAMINOPHEN 500 MG
1000 TABLET ORAL ONCE
Refills: 0 | Status: COMPLETED | OUTPATIENT
Start: 2023-09-12 | End: 2023-09-12

## 2023-09-12 RX ORDER — SERTRALINE 25 MG/1
150 TABLET, FILM COATED ORAL DAILY
Refills: 0 | Status: DISCONTINUED | OUTPATIENT
Start: 2023-09-12 | End: 2023-09-15

## 2023-09-12 RX ORDER — SERTRALINE 25 MG/1
1 TABLET, FILM COATED ORAL
Refills: 0 | DISCHARGE

## 2023-09-12 RX ORDER — FLUOXETINE HCL 10 MG
10 CAPSULE ORAL DAILY
Refills: 0 | Status: DISCONTINUED | OUTPATIENT
Start: 2023-09-12 | End: 2023-09-15

## 2023-09-12 RX ORDER — ALBUTEROL 90 UG/1
2 AEROSOL, METERED ORAL
Qty: 0 | Refills: 0 | DISCHARGE

## 2023-09-12 RX ADMIN — LAMOTRIGINE 200 MILLIGRAM(S): 25 TABLET, ORALLY DISINTEGRATING ORAL at 18:00

## 2023-09-12 RX ADMIN — METHOCARBAMOL 500 MILLIGRAM(S): 500 TABLET, FILM COATED ORAL at 05:53

## 2023-09-12 RX ADMIN — GABAPENTIN 300 MILLIGRAM(S): 400 CAPSULE ORAL at 05:53

## 2023-09-12 RX ADMIN — SENNA PLUS 2 TABLET(S): 8.6 TABLET ORAL at 21:19

## 2023-09-12 RX ADMIN — METHOCARBAMOL 500 MILLIGRAM(S): 500 TABLET, FILM COATED ORAL at 21:20

## 2023-09-12 RX ADMIN — LAMOTRIGINE 200 MILLIGRAM(S): 25 TABLET, ORALLY DISINTEGRATING ORAL at 00:50

## 2023-09-12 RX ADMIN — Medication 81 MILLIGRAM(S): at 13:10

## 2023-09-12 RX ADMIN — Medication 10 MILLIGRAM(S): at 13:12

## 2023-09-12 RX ADMIN — Medication 400 MILLIGRAM(S): at 11:29

## 2023-09-12 RX ADMIN — RISPERIDONE 3 MILLIGRAM(S): 4 TABLET ORAL at 00:50

## 2023-09-12 RX ADMIN — CHLORHEXIDINE GLUCONATE 1 APPLICATION(S): 213 SOLUTION TOPICAL at 05:54

## 2023-09-12 RX ADMIN — GABAPENTIN 300 MILLIGRAM(S): 400 CAPSULE ORAL at 21:19

## 2023-09-12 RX ADMIN — POLYETHYLENE GLYCOL 3350 17 GRAM(S): 17 POWDER, FOR SOLUTION ORAL at 13:13

## 2023-09-12 RX ADMIN — GABAPENTIN 300 MILLIGRAM(S): 400 CAPSULE ORAL at 13:11

## 2023-09-12 RX ADMIN — RISPERIDONE 2 MILLIGRAM(S): 4 TABLET ORAL at 13:12

## 2023-09-12 RX ADMIN — METHOCARBAMOL 500 MILLIGRAM(S): 500 TABLET, FILM COATED ORAL at 13:28

## 2023-09-12 RX ADMIN — Medication 1000 MILLIGRAM(S): at 12:30

## 2023-09-12 RX ADMIN — RISPERIDONE 3 MILLIGRAM(S): 4 TABLET ORAL at 21:20

## 2023-09-12 RX ADMIN — ENOXAPARIN SODIUM 40 MILLIGRAM(S): 100 INJECTION SUBCUTANEOUS at 19:49

## 2023-09-12 RX ADMIN — ATORVASTATIN CALCIUM 80 MILLIGRAM(S): 80 TABLET, FILM COATED ORAL at 21:20

## 2023-09-12 RX ADMIN — Medication 40 MILLIEQUIVALENT(S): at 05:53

## 2023-09-12 RX ADMIN — SERTRALINE 150 MILLIGRAM(S): 25 TABLET, FILM COATED ORAL at 13:10

## 2023-09-12 NOTE — PHYSICAL THERAPY INITIAL EVALUATION ADULT - GROSSLY INTACT, SENSORY
diminished light touch sensation to RLE below the knee, absent proprioception to right great toe./Right LE

## 2023-09-12 NOTE — PROGRESS NOTE ADULT - SUBJECTIVE AND OBJECTIVE BOX
Preliminary note, offical recommendations pending attending review/signature   [facility] Stroke Team  Progress Note     HPI:  The patient is a 53y Female w/ noted/reported PMhx of asthma, pseudoseizures, htn, syncope, former nicotine, HTN, allergies, partial hysterectomy, bradycardia who presented with right sided weakness. Patient states while driving in car she suddenly was unable to lift her right foot and had to use left foot to stop. Symptoms were sudden onset at about 0900. Presented to ED via EMS where stroke code was called.  I spoke with ED residents' who said NIHSS=4 and that she had no contraindicatiosn. to Tenecteplase so I suggested that they giev it.  Tenecteplase was administered administered at  11-Sep-2023 11:01.      SUBJECTIVE: No events overnight.  No new neurologic complaints.  ROS reported negative unless otherwise noted.    atorvastatin 80 milliGRAM(s) Oral at bedtime  chlorhexidine 2% Cloths 1 Application(s) Topical <User Schedule>  dextrose 50% Injectable 25 Gram(s) IV Push once  dextrose 50% Injectable 25 Gram(s) IV Push once  dextrose 50% Injectable 12.5 Gram(s) IV Push once  FLUoxetine 10 milliGRAM(s) Oral daily  gabapentin 300 milliGRAM(s) Oral three times a day  insulin lispro (ADMELOG) corrective regimen sliding scale   SubCutaneous every 6 hours  lamoTRIgine 200 milliGRAM(s) Oral every 12 hours  methocarbamol 500 milliGRAM(s) Oral three times a day  polyethylene glycol 3350 17 Gram(s) Oral daily  risperiDONE   Tablet 3 milliGRAM(s) Oral at bedtime  risperiDONE   Tablet 2 milliGRAM(s) Oral daily  senna 2 Tablet(s) Oral at bedtime  sertraline 150 milliGRAM(s) Oral daily      PHYSICAL EXAM:   Vital Signs Last 24 Hrs  T(C): 36.7 (12 Sep 2023 03:04), Max: 37.1 (11 Sep 2023 11:15)  T(F): 98.1 (12 Sep 2023 03:04), Max: 98.8 (11 Sep 2023 11:15)  HR: 57 (12 Sep 2023 07:01) (52 - 68)  BP: 147/84 (12 Sep 2023 07:01) (116/69 - 163/80)  BP(mean): 100 (12 Sep 2023 07:01) (79 - 107)  RR: 14 (12 Sep 2023 07:01) (13 - 21)  SpO2: 98% (12 Sep 2023 07:01) (93% - 99%)    Parameters below as of 12 Sep 2023 04:01  Patient On (Oxygen Delivery Method): room air    EXAM PENDING     General: NAD    Detailed Neurologic Exam:    Mental status: The patient is awake and alert and has normal attention span.  The patient is fully oriented in 3 spheres (corrects quickly from august to sept). The patient is oriented to current events. The patient is able to name objects, follow commands, repeat sentences.    Cranial nerves: left facial palsy, subtle dysarthria, tongue deviated to left, Pupils equal and react symmetrically to light. There is no visual field deficit to confrontation. Extraocular motion is full with no nystagmus. There is no ptosis. Facial sensation is intact.  Palate elevates symmetrically.      Motor: There is normal bulk and tone.  There is no tremor.   Strength is 4/5 in the right arm and leg. RUE/RLE drift   Strength is 5/5 in the left arm and leg.    Sensation: Intact to light touch, notes decreased on right in arm, does not report feels in leg       Cerebellar: There is no dysmetria on finger to nose testing.    Gait : deferred    LABS:                        13.7   5.48  )-----------( 288      ( 12 Sep 2023 03:45 )             41.7    09-12    139  |  102  |  13.6  ----------------------------<  93  3.5   |  24.0  |  0.78    Ca    9.8      12 Sep 2023 03:45    TPro  7.3  /  Alb  4.4  /  TBili  0.2<L>  /  DBili  0.1  /  AST  18  /  ALT  16  /  AlkPhos  89  09-12  PT/INR - ( 11 Sep 2023 10:40 )   PT: 11.0 sec;   INR: 0.99 ratio         PTT - ( 11 Sep 2023 10:40 )  PTT:32.2 sec      Lipid Profile (09.12.23 @ 03:45)    Cholesterol: 284 mg/dL   Triglycerides, Serum: 140 mg/dL   HDL Cholesterol: 74 mg/dL   Non HDL Cholesterol: 210    LDL Cholesterol Calculated: 182 mg/dL.    A1C with Estimated Average Glucose (09.12.23 @ 03:45)    A1C with Estimated Average Glucose Result: 5.1 %   Estimated Average Glucose: 100 mg/dL      RADIOLOGY & ADDITIONAL STUDIES (independently reviewed unless otherwise noted):  (09.11.23 @ 10:56)   IMPRESSION:  HEAD CT: No acute intracranial hemorrhage or acute territorial infarction.  CT PERFUSION demonstrated: No asymmetric or territorial perfusion   abnormality.  If symptoms persist consider follow-up imaging with MRI of the brain if   no contraindications.    CTA COW:  Patent intracranial circulation without flow limiting stenosis   or large vessel occlusion.    CTA NECK: Patent, ECAs, ICAs, no  hemodynamically significant stenosis at    ICA origins by NASCET criteria.  Bilateral vertebral arteries are patent without flow limiting stenosis.    TTE Echo Complete w/ Contrast w/ Doppler (09.11.23 @ 17:09)   Summary:   1. Left ventricular ejection fraction, by visual estimation, is 55 to   60%.   2. Normal global left ventricular systolic function.   3. Normal left atrial size.   4. Normal right atrial size.   5. Mild mitral annular calcification.   6. Thickening of the anterior and posterior mitral valve leaflets.   7. Trace mitral valve regurgitation.   8. Mild tricuspid regurgitation.               Preliminary note, offical recommendations pending attending review/signature   [facility] Stroke Team  Progress Note     HPI:  The patient is a 53y Female w/ noted/reported PMhx of asthma, pseudoseizures, htn, syncope, former nicotine, HTN, allergies, partial hysterectomy, bradycardia who presented with right sided weakness. Patient states while driving in car she suddenly was unable to lift her right foot and had to use left foot to stop. Symptoms were sudden onset at about 0900. Presented to ED via EMS where stroke code was called.  I spoke with ED residents' who said NIHSS=4 and that she had no contraindicatiosn. to Tenecteplase so I suggested that they giev it.  Tenecteplase was administered administered at  11-Sep-2023 11:01.      SUBJECTIVE: No events overnight. Headache remains. Notes trouble opening right eye.  No new neurologic complaints.  ROS reported negative unless otherwise noted.    atorvastatin 80 milliGRAM(s) Oral at bedtime  chlorhexidine 2% Cloths 1 Application(s) Topical <User Schedule>  dextrose 50% Injectable 25 Gram(s) IV Push once  dextrose 50% Injectable 25 Gram(s) IV Push once  dextrose 50% Injectable 12.5 Gram(s) IV Push once  FLUoxetine 10 milliGRAM(s) Oral daily  gabapentin 300 milliGRAM(s) Oral three times a day  insulin lispro (ADMELOG) corrective regimen sliding scale   SubCutaneous every 6 hours  lamoTRIgine 200 milliGRAM(s) Oral every 12 hours  methocarbamol 500 milliGRAM(s) Oral three times a day  polyethylene glycol 3350 17 Gram(s) Oral daily  risperiDONE   Tablet 3 milliGRAM(s) Oral at bedtime  risperiDONE   Tablet 2 milliGRAM(s) Oral daily  senna 2 Tablet(s) Oral at bedtime  sertraline 150 milliGRAM(s) Oral daily      PHYSICAL EXAM:   Vital Signs Last 24 Hrs  T(C): 36.7 (12 Sep 2023 03:04), Max: 37.1 (11 Sep 2023 11:15)  T(F): 98.1 (12 Sep 2023 03:04), Max: 98.8 (11 Sep 2023 11:15)  HR: 57 (12 Sep 2023 07:01) (52 - 68)  BP: 147/84 (12 Sep 2023 07:01) (116/69 - 163/80)  BP(mean): 100 (12 Sep 2023 07:01) (79 - 107)  RR: 14 (12 Sep 2023 07:01) (13 - 21)  SpO2: 98% (12 Sep 2023 07:01) (93% - 99%)    Parameters below as of 12 Sep 2023 04:01  Patient On (Oxygen Delivery Method): room air         General: NAD    Detailed Neurologic Exam:    Mental status: The patient is awake and alert and has normal attention span.  The patient is  oriented to person, place, time. The patient is able to name objects, follow commands, repeat sentences.    Cranial nerves: left facial palsy, subtle dysarthria, tongue deviated to left, Pupils equal and react symmetrically to light. There is no visual field deficit to confrontation. Extraocular motion is full with no nystagmus. Right eye remains closed but is able to open voluntarily, right ptosis . Facial sensation is intact.  Palate elevates symmetrically.      Motor: There is normal bulk and tone.  There is no tremor.   Strength is 4/5 in the right arm and leg. RUE/RLE slight drift   Strength is 5/5 in the left arm and leg.    Sensation: Intact to light touch in all 4 extremities no extinction     Cerebellar: There is no dysmetria on finger to nose testing.    Gait : deferred    LABS:                        13.7   5.48  )-----------( 288      ( 12 Sep 2023 03:45 )             41.7    09-12    139  |  102  |  13.6  ----------------------------<  93  3.5   |  24.0  |  0.78    Ca    9.8      12 Sep 2023 03:45    TPro  7.3  /  Alb  4.4  /  TBili  0.2<L>  /  DBili  0.1  /  AST  18  /  ALT  16  /  AlkPhos  89  09-12  PT/INR - ( 11 Sep 2023 10:40 )   PT: 11.0 sec;   INR: 0.99 ratio         PTT - ( 11 Sep 2023 10:40 )  PTT:32.2 sec      Lipid Profile (09.12.23 @ 03:45)    Cholesterol: 284 mg/dL   Triglycerides, Serum: 140 mg/dL   HDL Cholesterol: 74 mg/dL   Non HDL Cholesterol: 210    LDL Cholesterol Calculated: 182 mg/dL.    A1C with Estimated Average Glucose (09.12.23 @ 03:45)    A1C with Estimated Average Glucose Result: 5.1 %   Estimated Average Glucose: 100 mg/dL      RADIOLOGY & ADDITIONAL STUDIES (independently reviewed unless otherwise noted):    CT Head No Cont (09.12.23 @ 11:12)   No acute intracranial hemorrhage, mass effect or demarcated territorial   infarction. .    (09.11.23 @ 10:56)   IMPRESSION:  HEAD CT: No acute intracranial hemorrhage or acute territorial infarction.  CT PERFUSION demonstrated: No asymmetric or territorial perfusion   abnormality.  If symptoms persist consider follow-up imaging with MRI of the brain if   no contraindications.    CTA COW:  Patent intracranial circulation without flow limiting stenosis   or large vessel occlusion.    CTA NECK: Patent, ECAs, ICAs, no  hemodynamically significant stenosis at    ICA origins by NASCET criteria.  Bilateral vertebral arteries are patent without flow limiting stenosis.    TTE Echo Complete w/ Contrast w/ Doppler (09.11.23 @ 17:09)   Summary:   1. Left ventricular ejection fraction, by visual estimation, is 55 to   60%.   2. Normal global left ventricular systolic function.   3. Normal left atrial size.   4. Normal right atrial size.   5. Mild mitral annular calcification.   6. Thickening of the anterior and posterior mitral valve leaflets.   7. Trace mitral valve regurgitation.   8. Mild tricuspid regurgitation.               [facility] Stroke Team  Progress Note     HPI:  The patient is a 53y Female w/ noted/reported PMhx of asthma, pseudoseizures, htn, syncope, former nicotine, HTN, allergies, partial hysterectomy, bradycardia who presented with right sided weakness. Patient states while driving in car she suddenly was unable to lift her right foot and had to use left foot to stop. Symptoms were sudden onset at about 0900. Presented to ED via EMS where stroke code was called.  I spoke with ED residents' who said NIHSS=4 and that she had no contraindications to Tenecteplase so I suggested that they give it.  Tenecteplase was administered administered at  11-Sep-2023 11:01.      SUBJECTIVE: No events overnight. Headache remains. Notes trouble opening right eye.  No new neurologic complaints.  ROS reported negative unless otherwise noted.    atorvastatin 80 milliGRAM(s) Oral at bedtime  chlorhexidine 2% Cloths 1 Application(s) Topical <User Schedule>  dextrose 50% Injectable 25 Gram(s) IV Push once  dextrose 50% Injectable 25 Gram(s) IV Push once  dextrose 50% Injectable 12.5 Gram(s) IV Push once  FLUoxetine 10 milliGRAM(s) Oral daily  gabapentin 300 milliGRAM(s) Oral three times a day  insulin lispro (ADMELOG) corrective regimen sliding scale   SubCutaneous every 6 hours  lamoTRIgine 200 milliGRAM(s) Oral every 12 hours  methocarbamol 500 milliGRAM(s) Oral three times a day  polyethylene glycol 3350 17 Gram(s) Oral daily  risperiDONE   Tablet 3 milliGRAM(s) Oral at bedtime  risperiDONE   Tablet 2 milliGRAM(s) Oral daily  senna 2 Tablet(s) Oral at bedtime  sertraline 150 milliGRAM(s) Oral daily      PHYSICAL EXAM:   Vital Signs Last 24 Hrs  T(C): 36.7 (12 Sep 2023 03:04), Max: 37.1 (11 Sep 2023 11:15)  T(F): 98.1 (12 Sep 2023 03:04), Max: 98.8 (11 Sep 2023 11:15)  HR: 57 (12 Sep 2023 07:01) (52 - 68)  BP: 147/84 (12 Sep 2023 07:01) (116/69 - 163/80)  BP(mean): 100 (12 Sep 2023 07:01) (79 - 107)  RR: 14 (12 Sep 2023 07:01) (13 - 21)  SpO2: 98% (12 Sep 2023 07:01) (93% - 99%)    Parameters below as of 12 Sep 2023 04:01  Patient On (Oxygen Delivery Method): room air         General: NAD    Detailed Neurologic Exam:    Mental status: The patient is awake and alert and has normal attention span.  The patient is  oriented to person, place, time. The patient is able to name objects, follow commands, repeat sentences.    Cranial nerves: left facial palsy, subtle dysarthria, tongue deviated to left, Pupils equal and react symmetrically to light. There is no visual field deficit to confrontation. Extraocular motion is full with no nystagmus. Right eye remains closed but is able to open voluntarily, right ptosis . Facial sensation is intact.  Palate elevates symmetrically.      Motor: There is normal bulk and tone.  There is no tremor.   Strength is 4/5 in the right arm and leg. RUE/RLE slight drift   Strength is 5/5 in the left arm and leg.    Sensation: Intact to light touch in all 4 extremities no extinction     Cerebellar: There is no dysmetria on finger to nose testing.    Gait : deferred    LABS:                        13.7   5.48  )-----------( 288      ( 12 Sep 2023 03:45 )             41.7    09-12    139  |  102  |  13.6  ----------------------------<  93  3.5   |  24.0  |  0.78    Ca    9.8      12 Sep 2023 03:45    TPro  7.3  /  Alb  4.4  /  TBili  0.2<L>  /  DBili  0.1  /  AST  18  /  ALT  16  /  AlkPhos  89  09-12  PT/INR - ( 11 Sep 2023 10:40 )   PT: 11.0 sec;   INR: 0.99 ratio         PTT - ( 11 Sep 2023 10:40 )  PTT:32.2 sec      Lipid Profile (09.12.23 @ 03:45)    Cholesterol: 284 mg/dL   Triglycerides, Serum: 140 mg/dL   HDL Cholesterol: 74 mg/dL   Non HDL Cholesterol: 210    LDL Cholesterol Calculated: 182 mg/dL.    A1C with Estimated Average Glucose (09.12.23 @ 03:45)    A1C with Estimated Average Glucose Result: 5.1 %   Estimated Average Glucose: 100 mg/dL      RADIOLOGY & ADDITIONAL STUDIES (independently reviewed unless otherwise noted):    CT Head No Cont (09.12.23 @ 11:12)   No acute intracranial hemorrhage, mass effect or demarcated territorial   infarction. .    (09.11.23 @ 10:56)   IMPRESSION:  HEAD CT: No acute intracranial hemorrhage or acute territorial infarction.  CT PERFUSION demonstrated: No asymmetric or territorial perfusion   abnormality.  If symptoms persist consider follow-up imaging with MRI of the brain if   no contraindications.    CTA COW:  Patent intracranial circulation without flow limiting stenosis   or large vessel occlusion.    CTA NECK: Patent, ECAs, ICAs, no  hemodynamically significant stenosis at    ICA origins by NASCET criteria.  Bilateral vertebral arteries are patent without flow limiting stenosis.    TTE Echo Complete w/ Contrast w/ Doppler (09.11.23 @ 17:09)   Summary:   1. Left ventricular ejection fraction, by visual estimation, is 55 to   60%.   2. Normal global left ventricular systolic function.   3. Normal left atrial size.   4. Normal right atrial size.   5. Mild mitral annular calcification.   6. Thickening of the anterior and posterior mitral valve leaflets.   7. Trace mitral valve regurgitation.   8. Mild tricuspid regurgitation.

## 2023-09-12 NOTE — PHYSICAL THERAPY INITIAL EVALUATION ADULT - ADDITIONAL COMMENTS
Pt reports she was I in ADLs, ambulation and stairs prior to admission.  Pt lives in a private home with 2 TASH with 0 handrails, and no steps inside.  Pt lives with her  and 28 yo son who both work full time.

## 2023-09-12 NOTE — OCCUPATIONAL THERAPY INITIAL EVALUATION ADULT - NAME OF CLINICIAN
Zunilda Alicea May   11/19/2018   Anticoagulation Therapy Visit    Description:  78 year old female   Provider:  Yunior Huang MD   Department:  [facility]           INR as of 11/19/2018     Today's INR 2.13      Anticoagulation Summary as of 11/19/2018     INR goal 2.0-3.0   Today's INR 2.13   Full warfarin instructions 5 mg on Mon, Wed, Sat; 2.5 mg all other days   Next INR check 11/27/2018    Indications   Long term current use of anticoagulants with INR goal of 2.0-3.0 [Z79.01]  Atrial fibrillation and flutter (H) [I48.91  I48.92]         Contact Numbers     [facility] Phone Numbers:  Anticoagulation Clinic Appointments : [phone]  Anticoagulation Nurse: [phone]         November 2018 Details    Sun Mon Tue Wed Thu Fri Sat         1               2               3                 4               5               6               7               8               9               10                 11               12               13               14               15               16               17                 18               19      5 mg   See details      20      2.5 mg         21      5 mg         22      2.5 mg         23      2.5 mg         24      5 mg           25      2.5 mg         26      5 mg         27            28               29               30                 Date Details   11/19 This INR check       Date of next INR:  11/27/2018         How to take your warfarin dose     To take:  2.5 mg Take 1 of the 2.5 mg tablets.    To take:  5 mg Take 2 of the 2.5 mg tablets.            GOPI, Iris

## 2023-09-12 NOTE — PHYSICAL THERAPY INITIAL EVALUATION ADULT - MANUAL MUSCLE TESTING RESULTS, REHAB EVAL
RLE: hip flexors, knee flexors and knee extensors all 3+/5, ankle dorsiflexors 4/5.  LLE: hip flexors, knee extensors and flexors and ankle dorsiflexors all 5/5

## 2023-09-12 NOTE — CONSULT NOTE ADULT - SUBJECTIVE AND OBJECTIVE BOX
53yF was admitted on 09-11 with right sided weakness that fell getting into her house.      Imaging Reviewed Today:  HEAD CT -  No acute intracranial hemorrhage or acute territorial infarction.     CT PERFUSION demonstrated - No asymmetric or territorial perfusion abnormality. If symptoms persist consider follow-up imaging with MRI of the brain if no contraindications.    CTA COW -  Patent intracranial circulation without flow limiting stenosis or large vessel occlusion.    CTA NECK - Patent, ECAs, ICAs, no  hemodynamically significant stenosis at  ICAorigins by NASCET criteria. Bilateral vertebral arteries are patent without flow limiting stenosis.  ----------------------------  Patient reports her symptoms have overall improved.  Able to move herself in bed (limited by torn rotator cuff).       VITALS  T(C): 36.7 (09-12-23 @ 03:04), Max: 37.1 (09-11-23 @ 11:15)  HR: 63 (09-12-23 @ 08:01) (52 - 68)  BP: 127/87 (09-12-23 @ 08:01) (116/69 - 163/80)  RR: 14 (09-12-23 @ 08:01) (13 - 21)  SpO2: 98% (09-12-23 @ 08:01) (93% - 99%)  Wt(kg): --    PAST MEDICAL & SURGICAL HISTORY  Asthma    Pseudoseizures    Syncope    HTN (hypertension)    Former cigarette smoker    HLD (hyperlipidemia)    H/O multiple allergies    Allergic to IV contrast    S/P partial hysterectomy    History of partial hysterectomy         RECENT LABS REVIEWED    CBC Full  -  ( 12 Sep 2023 03:45 )  WBC Count : 5.48 K/uL  RBC Count : 4.67 M/uL  Hemoglobin : 13.7 g/dL  Hematocrit : 41.7 %  Platelet Count - Automated : 288 K/uL  Mean Cell Volume : 89.3 fl  Mean Cell Hemoglobin : 29.3 pg  Mean Cell Hemoglobin Concentration : 32.9 gm/dL  Auto Neutrophil # : x  Auto Lymphocyte # : x  Auto Monocyte # : x  Auto Eosinophil # : x  Auto Basophil # : x  Auto Neutrophil % : x  Auto Lymphocyte % : x  Auto Monocyte % : x  Auto Eosinophil % : x  Auto Basophil % : x    09-12    139  |  102  |  13.6  ----------------------------<  93  3.5   |  24.0  |  0.78    Ca    9.8      12 Sep 2023 03:45    TPro  7.3  /  Alb  4.4  /  TBili  0.2<L>  /  DBili  0.1  /  AST  18  /  ALT  16  /  AlkPhos  89  09-12    Urinalysis Basic - ( 12 Sep 2023 03:45 )    Color: x / Appearance: x / SG: x / pH: x  Gluc: 93 mg/dL / Ketone: x  / Bili: x / Urobili: x   Blood: x / Protein: x / Nitrite: x   Leuk Esterase: x / RBC: x / WBC x   Sq Epi: x / Non Sq Epi: x / Bacteria: x        ALLERGIES  Peaches (Rash)  walnut (Rash)  ciprofloxacin (Rash)  Macrodantin (Unknown)  sulfamethoxazole-trimethoprim (Rash)  sulfa drugs (Rash)  shellfish. (Rash)  contrast media (iodine-based) (Anaphylaxis)  Bactrim (Rash)  Pears (Rash)  Wellbutrin (Unknown)  Plums (Rash)      MEDICATIONS   atorvastatin 80 milliGRAM(s) Oral at bedtime  chlorhexidine 2% Cloths 1 Application(s) Topical <User Schedule>  dextrose 50% Injectable 25 Gram(s) IV Push once  dextrose 50% Injectable 25 Gram(s) IV Push once  dextrose 50% Injectable 12.5 Gram(s) IV Push once  FLUoxetine 10 milliGRAM(s) Oral daily  gabapentin 300 milliGRAM(s) Oral three times a day  insulin lispro (ADMELOG) corrective regimen sliding scale   SubCutaneous every 6 hours  lamoTRIgine 200 milliGRAM(s) Oral every 12 hours  methocarbamol 500 milliGRAM(s) Oral three times a day  polyethylene glycol 3350 17 Gram(s) Oral daily  risperiDONE   Tablet 3 milliGRAM(s) Oral at bedtime  risperiDONE   Tablet 2 milliGRAM(s) Oral daily  senna 2 Tablet(s) Oral at bedtime  sertraline 150 milliGRAM(s) Oral daily      ----------------------------------------------------------------------------------------  FUNCTIONAL HISTORY  Lives with spouse and sone, 2 TASH  Independent    CURRENT FUNCTIONAL STATUS  Independent with bed mobility     ----------------------------------------------------------------------------------------  PHYSICAL EXAM  Constitutional - NAD, Comfortable  HEENT - NCAT, EOMI  Neck - Supple, No limited ROM  Chest - Breathing comfortably, No wheezing  Cardiovascular - S1S2   Abdomen - Soft   Extremities - No C/C/E, No calf tenderness   Neurologic Exam -                    Cognitive - AAO to self, place, date, year, situation     Communication - Fluent, No dysarthria     Cranial Nerves - CN 2-12 intact     FUNCTIONAL MOTOR EXAM - No focal deficits                    LEFT    UE - ShAB 5/5, EF 5/5, EE 5/5, WE 5/5,  5/5                    RIGHT UE - ShAB 5/5, EF 5/5, EE 5/5, WE 5/5,  5/5                    LEFT    LE - HF 5/5, KE 5/5, DF 5/5, PF 5/5                    RIGHT LE - HF 5/5, KE 5/5, DF 5/5, PF 5/5        Sensory - Feels tingly on the right side  Psychiatric - Mood stable, Affect WNL  ----------------------------------------------------------------------------------------  ASSESSMENT/PLAN  53yFemale with functional deficits after developing right sided weakness  Right sided numbness - Lipitor  Mood - Prozac, Lamictal, Risperdal, Zoloft  Pain - Tylenol, Robaxin   DVT PPX - SCDs  Rehab/Impaired mobility and function - Patient continues to require hospitalization for the above diagnoses and ongoing active management of comorbid complications (ICU level care, CVA workup, Therapy evals) that are substantially impairing functional ability and impairing quality of life.      When medically optimized, based on the patient's clinical exam, current functional status and potential for progress, expect patient to achieve functional goals for DC HOME.      Will continue to follow. Rehab recommendations are dependent on how functional progress changes as well as how patient continues to participate and tolerate therapeutic interventions, which may change. Recommend ongoing mobilization by staff to maintain cardiopulmonary function and prevention of secondary complications related to debility. Discussed the specific management and recommendations above with rehab clinical care team/rehab liaison.      Total Time Spent on Encounter (reviewing clinical notes, labs, radiology, medications, patient history/exam, assessment and plan) - 75 minutes

## 2023-09-12 NOTE — OCCUPATIONAL THERAPY INITIAL EVALUATION ADULT - LIVES WITH, PROFILE
and 27 year old, in a private house with 2 steps to enter no railing and no steps inside/children/spouse
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
## 2023-09-12 NOTE — PHYSICAL THERAPY INITIAL EVALUATION ADULT - GAIT DEVIATIONS NOTED, PT EVAL
decreased sita/increased time in double stance/decreased velocity of limb motion/decreased step length
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
## 2023-09-12 NOTE — PHYSICAL THERAPY INITIAL EVALUATION ADULT - BALANCE DISTURBANCE, IDENTIFIED IMPAIRMENT CONTRIBUTE, REHAB EVAL
impaired coordination/impaired motor control/decreased sensation/impaired sensory feedback/decreased strength

## 2023-09-12 NOTE — PHYSICAL THERAPY INITIAL EVALUATION ADULT - DIAGNOSIS, PT EVAL
Pt presents with limitations of functional mobility in transfers and ambulation due to impairments of strength, proprioception, vision, and balance.

## 2023-09-12 NOTE — SBIRT NOTE ADULT - NSSBIRTAUDITDRGSCORE_GEN_A_CORE_CAL
2 Suturegard Intro: Intraoperative tissue expansion was performed, utilizing the SUTUREGARD device, in order to reduce wound tension.

## 2023-09-12 NOTE — SBIRT NOTE ADULT - NSSBIRTDRGPOSREINDET_GEN_A_CORE
Pt reports that she used cocaine in the past has been sober for 5 years declined any needs at this time.

## 2023-09-12 NOTE — OCCUPATIONAL THERAPY INITIAL EVALUATION ADULT - PLANNED THERAPY INTERVENTIONS, OT EVAL
ADL retraining/balance training/bed mobility training/motor coordination training/neuromuscular re-education/strengthening/transfer training Niacinamide Pregnancy And Lactation Text: These medications are considered safe during pregnancy.
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
## 2023-09-13 LAB
ANION GAP SERPL CALC-SCNC: 14 MMOL/L — SIGNIFICANT CHANGE UP (ref 5–17)
BUN SERPL-MCNC: 10.6 MG/DL — SIGNIFICANT CHANGE UP (ref 8–20)
CALCIUM SERPL-MCNC: 10 MG/DL — SIGNIFICANT CHANGE UP (ref 8.4–10.5)
CHLORIDE SERPL-SCNC: 100 MMOL/L — SIGNIFICANT CHANGE UP (ref 96–108)
CO2 SERPL-SCNC: 24 MMOL/L — SIGNIFICANT CHANGE UP (ref 22–29)
CREAT SERPL-MCNC: 0.74 MG/DL — SIGNIFICANT CHANGE UP (ref 0.5–1.3)
EGFR: 97 ML/MIN/1.73M2 — SIGNIFICANT CHANGE UP
GLUCOSE SERPL-MCNC: 94 MG/DL — SIGNIFICANT CHANGE UP (ref 70–99)
HCT VFR BLD CALC: 40.5 % — SIGNIFICANT CHANGE UP (ref 34.5–45)
HGB BLD-MCNC: 13.5 G/DL — SIGNIFICANT CHANGE UP (ref 11.5–15.5)
MCHC RBC-ENTMCNC: 29.9 PG — SIGNIFICANT CHANGE UP (ref 27–34)
MCHC RBC-ENTMCNC: 33.3 GM/DL — SIGNIFICANT CHANGE UP (ref 32–36)
MCV RBC AUTO: 89.6 FL — SIGNIFICANT CHANGE UP (ref 80–100)
PLATELET # BLD AUTO: 285 K/UL — SIGNIFICANT CHANGE UP (ref 150–400)
POTASSIUM SERPL-MCNC: 4.4 MMOL/L — SIGNIFICANT CHANGE UP (ref 3.5–5.3)
POTASSIUM SERPL-SCNC: 4.4 MMOL/L — SIGNIFICANT CHANGE UP (ref 3.5–5.3)
RBC # BLD: 4.52 M/UL — SIGNIFICANT CHANGE UP (ref 3.8–5.2)
RBC # FLD: 12.6 % — SIGNIFICANT CHANGE UP (ref 10.3–14.5)
SODIUM SERPL-SCNC: 138 MMOL/L — SIGNIFICANT CHANGE UP (ref 135–145)
WBC # BLD: 6.38 K/UL — SIGNIFICANT CHANGE UP (ref 3.8–10.5)
WBC # FLD AUTO: 6.38 K/UL — SIGNIFICANT CHANGE UP (ref 3.8–10.5)

## 2023-09-13 PROCEDURE — 99233 SBSQ HOSP IP/OBS HIGH 50: CPT

## 2023-09-13 PROCEDURE — 99233 SBSQ HOSP IP/OBS HIGH 50: CPT | Mod: GC

## 2023-09-13 PROCEDURE — 70450 CT HEAD/BRAIN W/O DYE: CPT | Mod: 26

## 2023-09-13 RX ORDER — ACETAMINOPHEN 500 MG
650 TABLET ORAL EVERY 6 HOURS
Refills: 0 | Status: DISCONTINUED | OUTPATIENT
Start: 2023-09-13 | End: 2023-09-15

## 2023-09-13 RX ADMIN — Medication 650 MILLIGRAM(S): at 05:11

## 2023-09-13 RX ADMIN — GABAPENTIN 300 MILLIGRAM(S): 400 CAPSULE ORAL at 21:19

## 2023-09-13 RX ADMIN — METHOCARBAMOL 500 MILLIGRAM(S): 500 TABLET, FILM COATED ORAL at 21:19

## 2023-09-13 RX ADMIN — LAMOTRIGINE 200 MILLIGRAM(S): 25 TABLET, ORALLY DISINTEGRATING ORAL at 19:00

## 2023-09-13 RX ADMIN — GABAPENTIN 300 MILLIGRAM(S): 400 CAPSULE ORAL at 05:37

## 2023-09-13 RX ADMIN — Medication 650 MILLIGRAM(S): at 04:11

## 2023-09-13 RX ADMIN — Medication 81 MILLIGRAM(S): at 14:55

## 2023-09-13 RX ADMIN — ENOXAPARIN SODIUM 40 MILLIGRAM(S): 100 INJECTION SUBCUTANEOUS at 21:19

## 2023-09-13 RX ADMIN — POLYETHYLENE GLYCOL 3350 17 GRAM(S): 17 POWDER, FOR SOLUTION ORAL at 14:55

## 2023-09-13 RX ADMIN — LAMOTRIGINE 200 MILLIGRAM(S): 25 TABLET, ORALLY DISINTEGRATING ORAL at 05:37

## 2023-09-13 RX ADMIN — RISPERIDONE 3 MILLIGRAM(S): 4 TABLET ORAL at 21:19

## 2023-09-13 RX ADMIN — SERTRALINE 150 MILLIGRAM(S): 25 TABLET, FILM COATED ORAL at 14:55

## 2023-09-13 RX ADMIN — Medication 10 MILLIGRAM(S): at 14:55

## 2023-09-13 RX ADMIN — ATORVASTATIN CALCIUM 80 MILLIGRAM(S): 80 TABLET, FILM COATED ORAL at 21:19

## 2023-09-13 RX ADMIN — METHOCARBAMOL 500 MILLIGRAM(S): 500 TABLET, FILM COATED ORAL at 14:55

## 2023-09-13 RX ADMIN — METHOCARBAMOL 500 MILLIGRAM(S): 500 TABLET, FILM COATED ORAL at 05:37

## 2023-09-13 RX ADMIN — CHLORHEXIDINE GLUCONATE 1 APPLICATION(S): 213 SOLUTION TOPICAL at 05:38

## 2023-09-13 RX ADMIN — GABAPENTIN 300 MILLIGRAM(S): 400 CAPSULE ORAL at 15:00

## 2023-09-13 RX ADMIN — RISPERIDONE 2 MILLIGRAM(S): 4 TABLET ORAL at 19:00

## 2023-09-13 NOTE — SPEECH LANGUAGE PATHOLOGY EVALUATION - COMMENTS
Trace dysarthria of speech impacting articulatory subsystem at conversational level. Overall intelligibility is good

## 2023-09-13 NOTE — PROGRESS NOTE ADULT - SUBJECTIVE AND OBJECTIVE BOX
Patient feels well.  Has been moving better.  Wants to go home.  Cannot explain why she didnt walk so well with PT.     FUNCTIONAL PROGRESS  9/13 Bed Mobility Indepedent    9/12 PT  Bed Mobility Analysis:     · Bed Mobility Limitations	decreased ability to use arms for pushing/pulling  · Impairments Contributing to Impaired Bed Mobility	impaired balance; decreased strength    Transfer: Bed to Chair:     Transfer Skill: Bed to Chair   · Level of [place]	minimum assist (75% patients effort)  · Physical Assist/Nonphysical Assist	2 person assist  · Weight-Bearing Restrictions	full weight-bearing  · Assistive Device	rolling walker    Bed/Chair Transfer Safety Analysis:     · Impairments Contributing to Impaired Transfers	impaired balance; decreased strength  · Transfer Safety Concerns Noted	decreased balance during turns; losing balance; decreased step length    Transfer: Sit to Stand:     · Level of [place]	minimum assist (75% patients effort)  · Physical Assist/Nonphysical Assist	1 person assist  · Weight-Bearing Restrictions	full weight-bearing  · Assistive Device	rolling walker    Transfer: Stand to Sit:     · Level of [place]	minimum assist (75% patients effort)  · Physical Assist/Nonphysical Assist	1 person assist  · Weight-Bearing Restrictions	full weight-bearing  · Assistive Device	rolling walker    Sit/Stand Transfer Safety Analysis:     · Transfer Safety Concerns Noted	losing balance  · Impairments Contributing to Impaired Transfers	decreased strength; impaired balance    Gait Skills:     · Level of [place]	minimum assist (75% patients effort)  · Physical Assist/Nonphysical Assist	2 person assist  · Weight-Bearing Restrictions	full weight-bearing  · Assistive Device	rolling walker  · Gait Distance	5 feet      VITALS  T(C): 36.7 (09-13-23 @ 09:46), Max: 37.1 (09-12-23 @ 23:13)  HR: 63 (09-13-23 @ 09:46) (51 - 71)  BP: 122/80 (09-13-23 @ 09:46) (114/76 - 159/74)  RR: 18 (09-13-23 @ 09:46) (13 - 25)  SpO2: 97% (09-13-23 @ 09:46) (94% - 98%)  Wt(kg): --    MEDICATIONS   acetaminophen     Tablet .. 650 milliGRAM(s) every 6 hours PRN  acetaminophen 325 mG/butalbital 50 mG/caffeine 40 mG 1 Tablet(s) every 6 hours PRN  aspirin enteric coated 81 milliGRAM(s) daily  atorvastatin 80 milliGRAM(s) at bedtime  chlorhexidine 2% Cloths 1 Application(s) <User Schedule>  enoxaparin Injectable 40 milliGRAM(s) <User Schedule>  FLUoxetine 10 milliGRAM(s) daily  gabapentin 300 milliGRAM(s) three times a day  lamoTRIgine 200 milliGRAM(s) every 12 hours  methocarbamol 500 milliGRAM(s) three times a day  polyethylene glycol 3350 17 Gram(s) daily  risperiDONE   Tablet 3 milliGRAM(s) at bedtime  risperiDONE   Tablet 2 milliGRAM(s) daily  senna 2 Tablet(s) at bedtime  sertraline 150 milliGRAM(s) daily      RECENT LABS/IMAGING  - Reviewed Today                        13.5   6.38  )-----------( 285      ( 13 Sep 2023 07:01 )             40.5     09-13    138  |  100  |  10.6  ----------------------------<  94  4.4   |  24.0  |  0.74    Ca    10.0      13 Sep 2023 07:01    TPro  7.3  /  Alb  4.4  /  TBili  0.2<L>  /  DBili  0.1  /  AST  18  /  ALT  16  /  AlkPhos  89  09-12    PT/INR - ( 11 Sep 2023 10:40 )   PT: 11.0 sec;   INR: 0.99 ratio         PTT - ( 11 Sep 2023 10:40 )  PTT:32.2 sec  Urinalysis Basic - ( 13 Sep 2023 07:01 )    Color: x / Appearance: x / SG: x / pH: x  Gluc: 94 mg/dL / Ketone: x  / Bili: x / Urobili: x   Blood: x / Protein: x / Nitrite: x   Leuk Esterase: x / RBC: x / WBC x   Sq Epi: x / Non Sq Epi: x / Bacteria: x            HEAD CT -  No acute intracranial hemorrhage or acute territorial infarction.     CT PERFUSION demonstrated - No asymmetric or territorial perfusion abnormality. If symptoms persist consider follow-up imaging with MRI of the brain if no contraindications.    CTA COW -  Patent intracranial circulation without flow limiting stenosis or large vessel occlusion.    CTA NECK - Patent, ECAs, ICAs, no  hemodynamically significant stenosis at  ICAorigins by NASCET criteria. Bilateral vertebral arteries are patent without flow limiting stenosis.    HEAD CT 9/12 - No acute intracranial hemorrhage, mass effect or demarcated territorial infarction.     ----------------------------------------------------------------------------------------  PHYSICAL EXAM  Constitutional - NAD, Comfortable  Extremities - No C/C/E, No calf tenderness   Neurologic Exam -                    Cognitive - AAO to self, place, date, year, situation     Communication - Fluent, No dysarthria     Cranial Nerves - CN 2-12 intact     FUNCTIONAL MOTOR EXAM - No focal deficits                    LEFT    UE - ShAB 5/5, EF 5/5, EE 5/5, WE 5/5,  5/5                    RIGHT UE - ShAB 5/5, EF 5/5, EE 5/5, WE 5/5,  5/5                    LEFT    LE - HF 5/5, KE 5/5, DF 5/5, PF 5/5                    RIGHT LE - HF 5/5, KE 5/5, DF 5/5, PF 5/5        Sensory - Intact  Psychiatric - Mood stable, Affect WNL  ----------------------------------------------------------------------------------------  ASSESSMENT/PLAN  53yFemale with functional deficits after developing right sided weakness  Right sided numbness - Lipitor, ASA  Mood - Prozac, Lamictal, Risperdal, Zoloft  Pain - Tylenol, Robaxin   DVT PPX - SCDs  Rehab/Impaired mobility and function - Patient continues to require hospitalization for the above diagnoses and ongoing active management of comorbid complications (CVA workup) that are substantially impairing functional ability and impairing quality of life.      When medically optimized, based on the patient's clinical exam and no CT findings, recommend DC HOME.      Will continue to follow. Rehab recommendations are dependent on how functional progress changes as well as how patient continues to participate and tolerate therapeutic interventions, which may change. Recommend ongoing mobilization by staff to maintain cardiopulmonary function and prevention of secondary complications related to debility. Discussed the specific management and recommendations above with rehab clinical care team/rehab liaison.

## 2023-09-13 NOTE — SPEECH LANGUAGE PATHOLOGY EVALUATION - ARTICULATION
mild imprecision within conversational speech; pt reports intermittent "slurring," particularly with fatigue or when shes been "talking a lot"

## 2023-09-13 NOTE — PROGRESS NOTE ADULT - ASSESSMENT
53F with a history of bipolar disorder, asthma, hypertension, stroke/TIA, and pseudoseizures who presented with right sided weakness and difficulty speaking. CT of the head was without acute intracranial pathology and tenecteplase was administered. The patient was admitted to he Neurosurgical Intensive Care Unit. Repeat CT was without significant change.    Stroke/TIA  - CT of the head was without acute intracranial pathology  - Neurology consultation noted  - Pending MRI of the brain  - Continue to monitor status with serial neurological examinations  -   - On aspirin and atorvastatin  - Physical Therapy and Physiatry evaluation noted, disposition pending clinical course    Bipolar disorder  - On fluoxetine and sertraline at the home dose  - Continue on lamotrigine and risperidone    Hypertension  - Close blood pressure monitoring  - Permissive hypertension    Headache  - Acetaminophen/butalbital/caffeine as needed   53F with a history of bipolar disorder, asthma, hypertension, stroke/TIA, and pseudoseizures who presented with right sided weakness and difficulty speaking. CT of the head was without acute intracranial pathology and tenecteplase was administered. The patient was admitted to he Neurosurgical Intensive Care Unit. Repeat CT was without significant change. Had an episode of pseudoseizure on 9/13/23 around 12pm    Stroke/TIA  - CT of the head was without acute intracranial pathology  - repeat CT head was ordered on 9/13/23 because pt was admitting to stroke like sxs, mostly likely a pseudoseizure episode   - Neurology recommends resuming psych meds once patient passes swallow  - MRI of the brain---->  -   - c/w aspirin and atorvastatin  - Physical Therapy and Physiatry evaluation noted, disposition pending clinical course    Bipolar disorder  - On fluoxetine and sertraline at the home dose  - Continue on lamotrigine and risperidone    Hypertension  - Close blood pressure monitoring  - Permissive hypertension    Headache  - Acetaminophen/butalbital/caffeine as needed   53F with a history of bipolar disorder, asthma, hypertension, stroke/TIA, and pseudoseizures who presented with right sided weakness and difficulty speaking. CT of the head was without acute intracranial pathology and tenecteplase was administered. The patient was admitted to he Neurosurgical Intensive Care Unit. Repeat CT was without significant change. She was downgraded to medicine floor, 5 tower.     Patient had posturing episode. Patient was responsive, VSS, airway intact. Explained to family present about pseudoseizures and the next step for involvement of psychiatry. Speech pathology consult concluded that Receptive/expressive language and cognitive skills appear WFL. Trace dysarthria of speech, impacting articulatory subsystem. Overall intelligibility is good. Rehab medicine following.      #Pseudoseziures  #Psychiatric  -CT head clean for stroke/TIA clean  -MRI pending     #Stroke/TIA  - CT of the head was without acute intracranial pathology  - repeat CT head was ordered on 9/13/23 because pt was admitting to stroke like sxs, mostly likely a pseudoseizure episode   - Neurology recommends resuming psych meds once patient passes swallow  - MRI of the brain---->  -   - c/w aspirin and atorvastatin  - Physical Therapy and Physiatry evaluation noted, disposition pending clinical course    Bipolar disorder  - On fluoxetine and sertraline at the home dose  - Continue on lamotrigine and risperidone    Hypertension  - Close blood pressure monitoring  - Permissive hypertension    Headache  - Acetaminophen/butalbital/caffeine as needed   53F with a history of bipolar disorder, asthma, hypertension, stroke/TIA, and pseudoseizures who presented with right sided weakness and difficulty speaking. CT of the head was without acute intracranial pathology and tenecteplase was administered. The patient was admitted to he Neurosurgical Intensive Care Unit. Repeat CT was without significant change. She was downgraded to medicine floor, 5 tower.     Patient had posturing episode. Patient was responsive, VSS, airway intact. Explained to family present about pseudoseizures and the next step for involvement of psychiatry. Speech pathology consult concluded that Receptive/expressive language and cognitive skills appear WFL. Trace dysarthria of speech, impacting articulatory subsystem. Overall intelligibility is good. Rehab medicine following.    #Pseudoseziures  #Psychiatric  -Both CT head clean  -MRI pending   -Extensive psychiatry history, including bipolar disorder and hx of pseudoseizures  -Consult psych, f/u with recommendations, re-evaluate med list     #Stroke/TIA  - Initial CT of the head was unremarkable  - repeat CT head 9/13/23 unremarkable  - Neurology recommends resuming psych meds once patient passes swallow  - MRI pending  - c/w aspirin and atorvastatin  - Physical Therapy and Physiatry evaluation noted, disposition pending clinical course    Bipolar disorder  - On fluoxetine and sertraline at the home dose  - Continue on lamotrigine and risperidone    Hypertension  - Close blood pressure monitoring  - Permissive hypertension    Headache  - Acetaminophen/butalbital/caffeine as needed
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
## 2023-09-13 NOTE — SPEECH LANGUAGE PATHOLOGY EVALUATION - RESPONSIVE NAMING
How Severe Is Your Skin Lesion?: mild
Have Your Skin Lesions Been Treated?: not been treated
Is This A New Presentation, Or A Follow-Up?: Skin Lesions
intact

## 2023-09-13 NOTE — SPEECH LANGUAGE PATHOLOGY EVALUATION - SLP DIAGNOSIS
Receptive/expressive language and cognitive skills appear WFL. Trace dysarthria of speech, impacting articulatory subsystem. Overall intelligibility is good.

## 2023-09-14 LAB
ALBUMIN SERPL ELPH-MCNC: 4.3 G/DL — SIGNIFICANT CHANGE UP (ref 3.3–5.2)
ALP SERPL-CCNC: 85 U/L — SIGNIFICANT CHANGE UP (ref 40–120)
ALT FLD-CCNC: 15 U/L — SIGNIFICANT CHANGE UP
ANION GAP SERPL CALC-SCNC: 15 MMOL/L — SIGNIFICANT CHANGE UP (ref 5–17)
AST SERPL-CCNC: 17 U/L — SIGNIFICANT CHANGE UP
BASOPHILS # BLD AUTO: 0.06 K/UL — SIGNIFICANT CHANGE UP (ref 0–0.2)
BASOPHILS NFR BLD AUTO: 0.9 % — SIGNIFICANT CHANGE UP (ref 0–2)
BILIRUB SERPL-MCNC: 0.2 MG/DL — LOW (ref 0.4–2)
BUN SERPL-MCNC: 16.2 MG/DL — SIGNIFICANT CHANGE UP (ref 8–20)
CALCIUM SERPL-MCNC: 9.9 MG/DL — SIGNIFICANT CHANGE UP (ref 8.4–10.5)
CHLORIDE SERPL-SCNC: 103 MMOL/L — SIGNIFICANT CHANGE UP (ref 96–108)
CO2 SERPL-SCNC: 22 MMOL/L — SIGNIFICANT CHANGE UP (ref 22–29)
CREAT SERPL-MCNC: 0.87 MG/DL — SIGNIFICANT CHANGE UP (ref 0.5–1.3)
CULTURE RESULTS: SIGNIFICANT CHANGE UP
EGFR: 80 ML/MIN/1.73M2 — SIGNIFICANT CHANGE UP
EOSINOPHIL # BLD AUTO: 0.1 K/UL — SIGNIFICANT CHANGE UP (ref 0–0.5)
EOSINOPHIL NFR BLD AUTO: 1.6 % — SIGNIFICANT CHANGE UP (ref 0–6)
GLUCOSE SERPL-MCNC: 92 MG/DL — SIGNIFICANT CHANGE UP (ref 70–99)
HCT VFR BLD CALC: 40.4 % — SIGNIFICANT CHANGE UP (ref 34.5–45)
HGB BLD-MCNC: 13.5 G/DL — SIGNIFICANT CHANGE UP (ref 11.5–15.5)
IMM GRANULOCYTES NFR BLD AUTO: 0.3 % — SIGNIFICANT CHANGE UP (ref 0–0.9)
LYMPHOCYTES # BLD AUTO: 1.82 K/UL — SIGNIFICANT CHANGE UP (ref 1–3.3)
LYMPHOCYTES # BLD AUTO: 28.6 % — SIGNIFICANT CHANGE UP (ref 13–44)
MAGNESIUM SERPL-MCNC: 2.3 MG/DL — SIGNIFICANT CHANGE UP (ref 1.6–2.6)
MCHC RBC-ENTMCNC: 29.7 PG — SIGNIFICANT CHANGE UP (ref 27–34)
MCHC RBC-ENTMCNC: 33.4 GM/DL — SIGNIFICANT CHANGE UP (ref 32–36)
MCV RBC AUTO: 88.8 FL — SIGNIFICANT CHANGE UP (ref 80–100)
MONOCYTES # BLD AUTO: 0.46 K/UL — SIGNIFICANT CHANGE UP (ref 0–0.9)
MONOCYTES NFR BLD AUTO: 7.2 % — SIGNIFICANT CHANGE UP (ref 2–14)
NEUTROPHILS # BLD AUTO: 3.9 K/UL — SIGNIFICANT CHANGE UP (ref 1.8–7.4)
NEUTROPHILS NFR BLD AUTO: 61.4 % — SIGNIFICANT CHANGE UP (ref 43–77)
PHOSPHATE SERPL-MCNC: 3.6 MG/DL — SIGNIFICANT CHANGE UP (ref 2.4–4.7)
PLATELET # BLD AUTO: 300 K/UL — SIGNIFICANT CHANGE UP (ref 150–400)
POTASSIUM SERPL-MCNC: 4.2 MMOL/L — SIGNIFICANT CHANGE UP (ref 3.5–5.3)
POTASSIUM SERPL-SCNC: 4.2 MMOL/L — SIGNIFICANT CHANGE UP (ref 3.5–5.3)
PROT SERPL-MCNC: 7.1 G/DL — SIGNIFICANT CHANGE UP (ref 6.6–8.7)
RBC # BLD: 4.55 M/UL — SIGNIFICANT CHANGE UP (ref 3.8–5.2)
RBC # FLD: 12.5 % — SIGNIFICANT CHANGE UP (ref 10.3–14.5)
SODIUM SERPL-SCNC: 140 MMOL/L — SIGNIFICANT CHANGE UP (ref 135–145)
SPECIMEN SOURCE: SIGNIFICANT CHANGE UP
WBC # BLD: 6.36 K/UL — SIGNIFICANT CHANGE UP (ref 3.8–10.5)
WBC # FLD AUTO: 6.36 K/UL — SIGNIFICANT CHANGE UP (ref 3.8–10.5)

## 2023-09-14 PROCEDURE — 99232 SBSQ HOSP IP/OBS MODERATE 35: CPT | Mod: GC

## 2023-09-14 PROCEDURE — 99233 SBSQ HOSP IP/OBS HIGH 50: CPT

## 2023-09-14 PROCEDURE — 70551 MRI BRAIN STEM W/O DYE: CPT | Mod: 26

## 2023-09-14 RX ADMIN — GABAPENTIN 300 MILLIGRAM(S): 400 CAPSULE ORAL at 14:50

## 2023-09-14 RX ADMIN — RISPERIDONE 3 MILLIGRAM(S): 4 TABLET ORAL at 21:12

## 2023-09-14 RX ADMIN — GABAPENTIN 300 MILLIGRAM(S): 400 CAPSULE ORAL at 21:12

## 2023-09-14 RX ADMIN — LAMOTRIGINE 200 MILLIGRAM(S): 25 TABLET, ORALLY DISINTEGRATING ORAL at 18:05

## 2023-09-14 RX ADMIN — ATORVASTATIN CALCIUM 80 MILLIGRAM(S): 80 TABLET, FILM COATED ORAL at 21:12

## 2023-09-14 RX ADMIN — RISPERIDONE 2 MILLIGRAM(S): 4 TABLET ORAL at 14:50

## 2023-09-14 RX ADMIN — Medication 10 MILLIGRAM(S): at 14:50

## 2023-09-14 RX ADMIN — METHOCARBAMOL 500 MILLIGRAM(S): 500 TABLET, FILM COATED ORAL at 14:50

## 2023-09-14 RX ADMIN — POLYETHYLENE GLYCOL 3350 17 GRAM(S): 17 POWDER, FOR SOLUTION ORAL at 15:00

## 2023-09-14 RX ADMIN — METHOCARBAMOL 500 MILLIGRAM(S): 500 TABLET, FILM COATED ORAL at 05:43

## 2023-09-14 RX ADMIN — ENOXAPARIN SODIUM 40 MILLIGRAM(S): 100 INJECTION SUBCUTANEOUS at 22:05

## 2023-09-14 RX ADMIN — SERTRALINE 150 MILLIGRAM(S): 25 TABLET, FILM COATED ORAL at 14:50

## 2023-09-14 RX ADMIN — METHOCARBAMOL 500 MILLIGRAM(S): 500 TABLET, FILM COATED ORAL at 21:12

## 2023-09-14 RX ADMIN — CHLORHEXIDINE GLUCONATE 1 APPLICATION(S): 213 SOLUTION TOPICAL at 05:45

## 2023-09-14 RX ADMIN — LAMOTRIGINE 200 MILLIGRAM(S): 25 TABLET, ORALLY DISINTEGRATING ORAL at 05:43

## 2023-09-14 RX ADMIN — GABAPENTIN 300 MILLIGRAM(S): 400 CAPSULE ORAL at 05:43

## 2023-09-14 RX ADMIN — Medication 81 MILLIGRAM(S): at 18:05

## 2023-09-14 NOTE — PROGRESS NOTE ADULT - NS ATTEND AMEND GEN_ALL_CORE FT
Seen and examined with the ACP team. Plan discussed with ACPs.    I agree with assessment and plan  as written with modifications made above.    will follow with you    Magan Albarado MD PhD   [id number]
Seen and examined with the ACP team. Plan discussed with ACPs.    I agree with assessment and plan  as written with modifications made above.    will follow with you    Magan Albarado MD PhD   [id number]

## 2023-09-14 NOTE — PROGRESS NOTE ADULT - ATTENDING COMMENTS
Agree with above   Patient seen and examined together with medical residents. No new complains. MRI of brain done, awaiting results   Vital signs,  labs and imaging  reviewed   Assessment and plan discussed
Agree with above   Patient seen and examined together with medical residents. During out encounter she was c/o numbness on the right side. CT head done and did not show any abnormalities. MRI of brain pending. Unlikely she has acute CVA. Concern for pseudoseizures   Vital signs,  labs and imaging  reviewed   Assessment and plan discussed with residents   Also, had extensive discussion with patients family:  and parents. Parent report that pt had similar encounters before and was treated by psychiatrist and neurologist.

## 2023-09-14 NOTE — PROGRESS NOTE ADULT - ASSESSMENT
53F with a history of bipolar disorder, asthma, hypertension, stroke/TIA, and pseudoseizures who presented with right sided weakness and difficulty speaking. CT of the head was without acute intracranial pathology and tenecteplase was administered. The patient was admitted to he Neurosurgical Intensive Care Unit. CT angiogram with no noted large vessel occlusion or flow limiting stenosis. Etiology concerning for left hemispheric dysfunction and given left facial palsy; possible multifocal or posterior circulation stroke.    Seen by neurology. Recommends close monitoring for neurologic deterioration, stroke neuro checks.      #Pseudoseziures  #Psychiatric  -Both CT head clean  -MRI pending   -Extensive psychiatry history, including bipolar disorder and hx of pseudoseizures. Family confirms patient has experienced similar episodes a few years ago and is under the care of a psychiatrist.  -Consult psych, f/u with recommendations, re-evaluate med list     #Stroke/TIA  - Initial CT of the head was unremarkable  - repeat CT head 9/13/23 unremarkable  - Neurology recommends resuming psych meds once patient passes swallow  - MRI pending  -TTE as noted  - c/w aspirin 81 mg and statin  -Dysphagia screen passed, advance diet as tolerated per protocol    - Physical Therapy and Physiatry evaluation noted, disposition pending clinical course    Bipolar disorder  - On fluoxetine and sertraline at the home dose  - Continue on lamotrigine and risperidone    Hypertension  - Close blood pressure monitoring  -titrate statin to LDL goal less than 70  - Permissive hypertension    Headache  - Acetaminophen/butalbital/caffeine as needed

## 2023-09-14 NOTE — PROGRESS NOTE ADULT - SUBJECTIVE AND OBJECTIVE BOX
Patient reports she had another episode of seizures.  As per RN, has been mobilizing OOB.   Wants to go home, but pending workup/MRI.     FUNCTIONAL PROGRESS  Bed Mobility  Bed Mobility Training Rehab Potential: good, to achieve stated therapy goals  Bed Mobility Training Sit-to-Supine: independent  Bed Mobility Training Supine-to-Sit: independent    Sit-Stand Transfer Training  Sit-to-Stand Transfer Training Rehab Potential: good, to achieve stated therapy goals  Transfer Training Sit-to-Stand Transfer: independent;  rolling walker  Transfer Training Stand-to-Sit Transfer: independent;  rolling walker  Sit-to-Stand Transfer Training Transfer Safety Analysis: decreased weight-shifting ability;  decreased strength;  impaired balance    Gait Training  Gait Training Rehab Potential: good, to achieve stated therapy goals  Gait Training: independent;  rolling walker;  125ft  Gait Analysis: decreased step length;  decreased stride length;  decreased weight-shifting ability;  decreased strength;  impaired balance    Stair Training  Physical Assist/Nonphysical Assist: independent, step to, single hand rail  Number of Stairs: 6     VITALS  T(C): 37 (09-14-23 @ 04:46), Max: 37.1 (09-13-23 @ 20:45)  HR: 67 (09-14-23 @ 04:46) (57 - 67)  BP: 117/72 (09-14-23 @ 04:46) (117/72 - 142/75)  RR: 16 (09-14-23 @ 04:46) (16 - 18)  SpO2: 92% (09-14-23 @ 04:46) (92% - 97%)  Wt(kg): --    MEDICATIONS   acetaminophen     Tablet .. 650 milliGRAM(s) every 6 hours PRN  acetaminophen 325 mG/butalbital 50 mG/caffeine 40 mG 1 Tablet(s) every 6 hours PRN  aspirin enteric coated 81 milliGRAM(s) daily  atorvastatin 80 milliGRAM(s) at bedtime  chlorhexidine 2% Cloths 1 Application(s) <User Schedule>  enoxaparin Injectable 40 milliGRAM(s) <User Schedule>  FLUoxetine 10 milliGRAM(s) daily  gabapentin 300 milliGRAM(s) three times a day  lamoTRIgine 200 milliGRAM(s) every 12 hours  methocarbamol 500 milliGRAM(s) three times a day  polyethylene glycol 3350 17 Gram(s) daily  risperiDONE   Tablet 3 milliGRAM(s) at bedtime  risperiDONE   Tablet 2 milliGRAM(s) daily  senna 2 Tablet(s) at bedtime  sertraline 150 milliGRAM(s) daily      RECENT LABS/IMAGING  - Reviewed Today                        13.5   6.36  )-----------( 300      ( 14 Sep 2023 06:24 )             40.4     09-14    140  |  103  |  16.2  ----------------------------<  92  4.2   |  22.0  |  0.87    Ca    9.9      14 Sep 2023 06:24  Phos  3.6     09-14  Mg     2.3     09-14    TPro  7.1  /  Alb  4.3  /  TBili  0.2<L>  /  DBili  x   /  AST  17  /  ALT  15  /  AlkPhos  85  09-14      Urinalysis Basic - ( 14 Sep 2023 06:24 )    Color: x / Appearance: x / SG: x / pH: x  Gluc: 92 mg/dL / Ketone: x  / Bili: x / Urobili: x   Blood: x / Protein: x / Nitrite: x   Leuk Esterase: x / RBC: x / WBC x   Sq Epi: x / Non Sq Epi: x / Bacteria: x        HEAD CT -  No acute intracranial hemorrhage or acute territorial infarction.     CT PERFUSION demonstrated - No asymmetric or territorial perfusion abnormality. If symptoms persist consider follow-up imaging with MRI of the brain if no contraindications.    CTA COW -  Patent intracranial circulation without flow limiting stenosis or large vessel occlusion.    CTA NECK - Patent, ECAs, ICAs, no  hemodynamically significant stenosis at  ICAorigins by NASCET criteria. Bilateral vertebral arteries are patent without flow limiting stenosis.    HEAD CT 9/12 - No acute intracranial hemorrhage, mass effect or demarcated territorial infarction.     ----------------------------------------------------------------------------------------  PHYSICAL EXAM  Constitutional - NAD, Comfortable  Extremities - No C/C/E, No calf tenderness   Neurologic Exam -                    Cognitive - AAO to self, place, date, year, situation     Communication - Fluent, No dysarthria     Cranial Nerves - CN 2-12 intact     FUNCTIONAL MOTOR EXAM - No focal deficits                    LEFT    UE - ShAB 5/5, EF 5/5, EE 5/5, WE 5/5,  5/5                    RIGHT UE - ShAB 5/5, EF 5/5, EE 5/5, WE 5/5,  5/5                    LEFT    LE - HF 5/5, KE 5/5, DF 5/5, PF 5/5                    RIGHT LE - HF 5/5, KE 5/5, DF 5/5, PF 5/5        Sensory - Intact  Psychiatric - Mood stable, Affect WNL  ----------------------------------------------------------------------------------------  ASSESSMENT/PLAN  53yFemale with functional deficits after developing right sided weakness  Right sided numbness - Lipitor, ASA  Mood - Prozac, Lamictal, Risperdal, Zoloft  Pain - Tylenol, Robaxin   DVT PPX - SCDs  Rehab/Impaired mobility and function - Patient continues to require hospitalization for the above diagnoses and ongoing active management of comorbid complications (CVA workup) that are substantially impairing functional ability and impairing quality of life.      When medically optimized, based on the patient's clinical exam and no CT findings, recommend DC HOME. Patient is independent.     Will sign off at this time. Thank you for allowing me to be part of your patient's care. Please reconsult PMR for additional rehab recommendations or dispo needs if functional status changes. Discussed the specific management and recommendations above with rehab clinical care team/rehab liaison.

## 2023-09-14 NOTE — PROGRESS NOTE ADULT - SUBJECTIVE AND OBJECTIVE BOX
Preliminary note, offical recommendations pending attending review/signature   [facility] Stroke Team  Progress Note     HPI:  The patient is a 53y Female w/ noted/reported PMhx of asthma, pseudoseizures, htn, syncope, former nicotine, HTN, allergies, partial hysterectomy, bradycardia who presented with right sided weakness. Patient states while driving in car she suddenly was unable to lift her right foot and had to use left foot to stop. Symptoms were sudden onset at about 0900. Presented to ED via EMS where stroke code was called.  I spoke with ED residents' who said NIHSS=4 and that she had no contraindications to Tenecteplase so I suggested that they give it.  Tenecteplase was administered administered at  11-Sep-2023 11:01.     SUBJECTIVE: No events overnight.  No new neurologic complaints.  ROS reported negative unless otherwise noted.    acetaminophen     Tablet .. 650 milliGRAM(s) Oral every 6 hours PRN  acetaminophen 325 mG/butalbital 50 mG/caffeine 40 mG 1 Tablet(s) Oral every 6 hours PRN  aspirin enteric coated 81 milliGRAM(s) Oral daily  atorvastatin 80 milliGRAM(s) Oral at bedtime  chlorhexidine 2% Cloths 1 Application(s) Topical <User Schedule>  enoxaparin Injectable 40 milliGRAM(s) SubCutaneous <User Schedule>  FLUoxetine 10 milliGRAM(s) Oral daily  gabapentin 300 milliGRAM(s) Oral three times a day  lamoTRIgine 200 milliGRAM(s) Oral every 12 hours  methocarbamol 500 milliGRAM(s) Oral three times a day  polyethylene glycol 3350 17 Gram(s) Oral daily  risperiDONE   Tablet 3 milliGRAM(s) Oral at bedtime  risperiDONE   Tablet 2 milliGRAM(s) Oral daily  senna 2 Tablet(s) Oral at bedtime  sertraline 150 milliGRAM(s) Oral daily      PHYSICAL EXAM:   Vital Signs Last 24 Hrs  T(C): 37 (14 Sep 2023 04:46), Max: 37.1 (13 Sep 2023 20:45)  T(F): 98.6 (14 Sep 2023 04:46), Max: 98.8 (13 Sep 2023 20:45)  HR: 67 (14 Sep 2023 04:46) (57 - 67)  BP: 117/72 (14 Sep 2023 04:46) (117/72 - 142/75)  BP(mean): --  RR: 16 (14 Sep 2023 04:46) (16 - 18)  SpO2: 92% (14 Sep 2023 04:46) (92% - 95%)    Parameters below as of 14 Sep 2023 04:46  Patient On (Oxygen Delivery Method): room air    EXAM PENDING   General: NAD    Detailed Neurologic Exam:    Mental status: The patient is awake and alert and has normal attention span.  The patient is  oriented to person, place, time. The patient is able to name objects, follow commands, repeat sentences.    Cranial nerves: left facial palsy, subtle dysarthria, tongue deviated to left, Pupils equal and react symmetrically to light. There is no visual field deficit to confrontation. Extraocular motion is full with no nystagmus. Right eye remains closed but is able to open voluntarily, right ptosis . Facial sensation is intact.  Palate elevates symmetrically.      Motor: There is normal bulk and tone.  There is no tremor.   Strength is 4/5 in the right arm and leg. RUE/RLE slight drift   Strength is 5/5 in the left arm and leg.    Sensation: Intact to light touch in all 4 extremities no extinction     Cerebellar: There is no dysmetria on finger to nose testing.    Gait : deferred    LABS:                        13.5   6.36  )-----------( 300      ( 14 Sep 2023 06:24 )             40.4    09-14    140  |  103  |  16.2  ----------------------------<  92  4.2   |  22.0  |  0.87    Ca    9.9      14 Sep 2023 06:24  Phos  3.6     09-14  Mg     2.3     09-14    TPro  7.1  /  Alb  4.3  /  TBili  0.2<L>  /  DBili  x   /  AST  17  /  ALT  15  /  AlkPhos  85  09-14        Lipid Profile (09.12.23 @ 03:45)    Cholesterol: 284 mg/dL   Triglycerides, Serum: 140 mg/dL   HDL Cholesterol: 74 mg/dL   Non HDL Cholesterol: 210    LDL Cholesterol Calculated: 182 mg/dL.    A1C with Estimated Average Glucose (09.12.23 @ 03:45)    A1C with Estimated Average Glucose Result: 5.1 %   Estimated Average Glucose: 100 mg/dL      RADIOLOGY & ADDITIONAL STUDIES (independently reviewed unless otherwise noted):    CT Head No Cont (09.12.23 @ 11:12)   No acute intracranial hemorrhage, mass effect or demarcated territorial   infarction. .    (09.11.23 @ 10:56)   IMPRESSION:  HEAD CT: No acute intracranial hemorrhage or acute territorial infarction.  CT PERFUSION demonstrated: No asymmetric or territorial perfusion   abnormality.  If symptoms persist consider follow-up imaging with MRI of the brain if   no contraindications.    CTA COW:  Patent intracranial circulation without flow limiting stenosis   or large vessel occlusion.    CTA NECK: Patent, ECAs, ICAs, no  hemodynamically significant stenosis at    ICA origins by NASCET criteria.  Bilateral vertebral arteries are patent without flow limiting stenosis.    TTE Echo Complete w/ Contrast w/ Doppler (09.11.23 @ 17:09)   Summary:   1. Left ventricular ejection fraction, by visual estimation, is 55 to   60%.   2. Normal global left ventricular systolic function.   3. Normal left atrial size.   4. Normal right atrial size.   5. Mild mitral annular calcification.   6. Thickening of the anterior and posterior mitral valve leaflets.   7. Trace mitral valve regurgitation.   8. Mild tricuspid regurgitation.                   Preliminary note, offical recommendations pending attending review/signature   [facility] Stroke Team  Progress Note     HPI:  The patient is a 53y Female w/ noted/reported PMhx of asthma, pseudoseizures, htn, syncope, former nicotine, HTN, allergies, partial hysterectomy, bradycardia who presented with right sided weakness. Patient states while driving in car she suddenly was unable to lift her right foot and had to use left foot to stop. Symptoms were sudden onset at about 0900. Presented to ED via EMS where stroke code was called.  I spoke with ED residents' who said NIHSS=4 and that she had no contraindications to Tenecteplase so I suggested that they give it.  Tenecteplase was administered administered at  11-Sep-2023 11:01.     SUBJECTIVE: No events overnight.  No new neurologic complaints.  Patient reports had pseudoseizures this morning. States was stressed from financial dept asking for $3200ROS reported negative unless otherwise noted.    acetaminophen     Tablet .. 650 milliGRAM(s) Oral every 6 hours PRN  acetaminophen 325 mG/butalbital 50 mG/caffeine 40 mG 1 Tablet(s) Oral every 6 hours PRN  aspirin enteric coated 81 milliGRAM(s) Oral daily  atorvastatin 80 milliGRAM(s) Oral at bedtime  chlorhexidine 2% Cloths 1 Application(s) Topical <User Schedule>  enoxaparin Injectable 40 milliGRAM(s) SubCutaneous <User Schedule>  FLUoxetine 10 milliGRAM(s) Oral daily  gabapentin 300 milliGRAM(s) Oral three times a day  lamoTRIgine 200 milliGRAM(s) Oral every 12 hours  methocarbamol 500 milliGRAM(s) Oral three times a day  polyethylene glycol 3350 17 Gram(s) Oral daily  risperiDONE   Tablet 3 milliGRAM(s) Oral at bedtime  risperiDONE   Tablet 2 milliGRAM(s) Oral daily  senna 2 Tablet(s) Oral at bedtime  sertraline 150 milliGRAM(s) Oral daily      PHYSICAL EXAM:   Vital Signs Last 24 Hrs  T(C): 37 (14 Sep 2023 04:46), Max: 37.1 (13 Sep 2023 20:45)  T(F): 98.6 (14 Sep 2023 04:46), Max: 98.8 (13 Sep 2023 20:45)  HR: 67 (14 Sep 2023 04:46) (57 - 67)  BP: 117/72 (14 Sep 2023 04:46) (117/72 - 142/75)  BP(mean): --  RR: 16 (14 Sep 2023 04:46) (16 - 18)  SpO2: 92% (14 Sep 2023 04:46) (92% - 95%)    Parameters below as of 14 Sep 2023 04:46  Patient On (Oxygen Delivery Method): room air     General: NAD    Detailed Neurologic Exam:    Mental status: The patient is awake and alert and has normal attention span.  The patient is  oriented to person, place, time. The patient is able to name objects, follow commands, repeat sentences.    Cranial nerves: left facial palsy- left lip depression , subtle dysarthria, Pupils equal and react symmetrically to light. There is no visual field deficit to confrontation. Extraocular motion is full with no nystagmus. Right eye remains closed but is able to open voluntarily, right ptosis . Facial sensation is intact.  Palate elevates symmetrically.      Motor: There is normal bulk and tone.  There is no tremor. No drift.   Strength is 5/5 in the right arm and leg.   Strength is 5/5 in the left arm and leg.    Sensation: Intact to light touch in all 4 extremities no extinction     Cerebellar: There is no dysmetria on finger to nose testing.    Gait : deferred    LABS:                        13.5   6.36  )-----------( 300      ( 14 Sep 2023 06:24 )             40.4    09-14    140  |  103  |  16.2  ----------------------------<  92  4.2   |  22.0  |  0.87    Ca    9.9      14 Sep 2023 06:24  Phos  3.6     09-14  Mg     2.3     09-14    TPro  7.1  /  Alb  4.3  /  TBili  0.2<L>  /  DBili  x   /  AST  17  /  ALT  15  /  AlkPhos  85  09-14        Lipid Profile (09.12.23 @ 03:45)    Cholesterol: 284 mg/dL   Triglycerides, Serum: 140 mg/dL   HDL Cholesterol: 74 mg/dL   Non HDL Cholesterol: 210    LDL Cholesterol Calculated: 182 mg/dL.    A1C with Estimated Average Glucose (09.12.23 @ 03:45)    A1C with Estimated Average Glucose Result: 5.1 %   Estimated Average Glucose: 100 mg/dL      RADIOLOGY & ADDITIONAL STUDIES (independently reviewed unless otherwise noted):    MR Head No Cont (09.14.23 @ 13:14)   IMPRESSION:  1. Cerebral hemispheric white matter lesions are nonspecific. In this age   group the differential diagnosis is primarily between an early   manifestation of ischemic white matter disease that would be advanced for   age versus an inflammatory process. One or both etiologies could   contribute to this imaging appearance. The presence of a solitary   pericallosal lesion is nonspecific, the overall pattern not strongly   suggestive of demyelinating disease. The extent of this abnormality is   mild-to-moderate.    2. There is no evidence of infarction.    CT Head No Cont (09.12.23 @ 11:12)   No acute intracranial hemorrhage, mass effect or demarcated territorial   infarction. .    (09.11.23 @ 10:56)   IMPRESSION:  HEAD CT: No acute intracranial hemorrhage or acute territorial infarction.  CT PERFUSION demonstrated: No asymmetric or territorial perfusion   abnormality.  If symptoms persist consider follow-up imaging with MRI of the brain if   no contraindications.    CTA COW:  Patent intracranial circulation without flow limiting stenosis   or large vessel occlusion.    CTA NECK: Patent, ECAs, ICAs, no  hemodynamically significant stenosis at    ICA origins by NASCET criteria.  Bilateral vertebral arteries are patent without flow limiting stenosis.    TTE Echo Complete w/ Contrast w/ Doppler (09.11.23 @ 17:09)   Summary:   1. Left ventricular ejection fraction, by visual estimation, is 55 to   60%.   2. Normal global left ventricular systolic function.   3. Normal left atrial size.   4. Normal right atrial size.   5. Mild mitral annular calcification.   6. Thickening of the anterior and posterior mitral valve leaflets.   7. Trace mitral valve regurgitation.   8. Mild tricuspid regurgitation.                   [facility] Stroke Team  Progress Note     HPI:  The patient is a 53y Female w/ noted/reported PMhx of asthma, pseudoseizures, htn, syncope, former nicotine, HTN, allergies, partial hysterectomy, bradycardia who presented with right sided weakness. Patient states while driving in car she suddenly was unable to lift her right foot and had to use left foot to stop. Symptoms were sudden onset at about 0900. Presented to ED via EMS where stroke code was called.  I spoke with ED residents' who said NIHSS=4 and that she had no contraindications to Tenecteplase so I suggested that they give it.  Tenecteplase was administered administered at  11-Sep-2023 11:01.     SUBJECTIVE: No events overnight.  No new neurologic complaints.  Patient reports had pseudoseizures this morning. States was stressed from financial dept asking for $3200.    ROS reported negative unless otherwise noted.    acetaminophen     Tablet .. 650 milliGRAM(s) Oral every 6 hours PRN  acetaminophen 325 mG/butalbital 50 mG/caffeine 40 mG 1 Tablet(s) Oral every 6 hours PRN  aspirin enteric coated 81 milliGRAM(s) Oral daily  atorvastatin 80 milliGRAM(s) Oral at bedtime  chlorhexidine 2% Cloths 1 Application(s) Topical <User Schedule>  enoxaparin Injectable 40 milliGRAM(s) SubCutaneous <User Schedule>  FLUoxetine 10 milliGRAM(s) Oral daily  gabapentin 300 milliGRAM(s) Oral three times a day  lamoTRIgine 200 milliGRAM(s) Oral every 12 hours  methocarbamol 500 milliGRAM(s) Oral three times a day  polyethylene glycol 3350 17 Gram(s) Oral daily  risperiDONE   Tablet 3 milliGRAM(s) Oral at bedtime  risperiDONE   Tablet 2 milliGRAM(s) Oral daily  senna 2 Tablet(s) Oral at bedtime  sertraline 150 milliGRAM(s) Oral daily      PHYSICAL EXAM:   Vital Signs Last 24 Hrs  T(C): 37 (14 Sep 2023 04:46), Max: 37.1 (13 Sep 2023 20:45)  T(F): 98.6 (14 Sep 2023 04:46), Max: 98.8 (13 Sep 2023 20:45)  HR: 67 (14 Sep 2023 04:46) (57 - 67)  BP: 117/72 (14 Sep 2023 04:46) (117/72 - 142/75)  BP(mean): --  RR: 16 (14 Sep 2023 04:46) (16 - 18)  SpO2: 92% (14 Sep 2023 04:46) (92% - 95%)    Parameters below as of 14 Sep 2023 04:46  Patient On (Oxygen Delivery Method): room air     General: NAD    Detailed Neurologic Exam:    Mental status: The patient is awake and alert and has normal attention span.  The patient is  oriented to person, place, time. The patient is able to name objects, follow commands, repeat sentences.    Cranial nerves: left facial palsy- left lip depression , subtle dysarthria, Pupils equal and react symmetrically to light. There is no visual field deficit to confrontation. Extraocular motion is full with no nystagmus. Right eye remains closed but is able to open voluntarily, right ptosis . Facial sensation is intact.  Palate elevates symmetrically.      Motor: There is normal bulk and tone.  There is no tremor. No drift.   Strength is 5/5 in the right arm and leg.   Strength is 5/5 in the left arm and leg.    Sensation: Intact to light touch in all 4 extremities no extinction     Cerebellar: There is no dysmetria on finger to nose testing.    Gait : deferred    LABS:                        13.5   6.36  )-----------( 300      ( 14 Sep 2023 06:24 )             40.4    09-14    140  |  103  |  16.2  ----------------------------<  92  4.2   |  22.0  |  0.87    Ca    9.9      14 Sep 2023 06:24  Phos  3.6     09-14  Mg     2.3     09-14    TPro  7.1  /  Alb  4.3  /  TBili  0.2<L>  /  DBili  x   /  AST  17  /  ALT  15  /  AlkPhos  85  09-14        Lipid Profile (09.12.23 @ 03:45)    Cholesterol: 284 mg/dL   Triglycerides, Serum: 140 mg/dL   HDL Cholesterol: 74 mg/dL   Non HDL Cholesterol: 210    LDL Cholesterol Calculated: 182 mg/dL.    A1C with Estimated Average Glucose (09.12.23 @ 03:45)    A1C with Estimated Average Glucose Result: 5.1 %   Estimated Average Glucose: 100 mg/dL      RADIOLOGY & ADDITIONAL STUDIES (independently reviewed unless otherwise noted):    MR Head No Cont (09.14.23 @ 13:14)   IMPRESSION:  1. Cerebral hemispheric white matter lesions are nonspecific. In this age   group the differential diagnosis is primarily between an early   manifestation of ischemic white matter disease that would be advanced for   age versus an inflammatory process. One or both etiologies could   contribute to this imaging appearance. The presence of a solitary   pericallosal lesion is nonspecific, the overall pattern not strongly   suggestive of demyelinating disease. The extent of this abnormality is   mild-to-moderate.    2. There is no evidence of infarction.    CT Head No Cont (09.12.23 @ 11:12)   No acute intracranial hemorrhage, mass effect or demarcated territorial   infarction. .    (09.11.23 @ 10:56)   IMPRESSION:  HEAD CT: No acute intracranial hemorrhage or acute territorial infarction.  CT PERFUSION demonstrated: No asymmetric or territorial perfusion   abnormality.  If symptoms persist consider follow-up imaging with MRI of the brain if   no contraindications.    CTA COW:  Patent intracranial circulation without flow limiting stenosis   or large vessel occlusion.    CTA NECK: Patent, ECAs, ICAs, no  hemodynamically significant stenosis at    ICA origins by NASCET criteria.  Bilateral vertebral arteries are patent without flow limiting stenosis.    TTE Echo Complete w/ Contrast w/ Doppler (09.11.23 @ 17:09)   Summary:   1. Left ventricular ejection fraction, by visual estimation, is 55 to   60%.   2. Normal global left ventricular systolic function.   3. Normal left atrial size.   4. Normal right atrial size.   5. Mild mitral annular calcification.   6. Thickening of the anterior and posterior mitral valve leaflets.   7. Trace mitral valve regurgitation.   8. Mild tricuspid regurgitation.
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
## 2023-09-15 ENCOUNTER — TRANSCRIPTION ENCOUNTER (OUTPATIENT)
Age: 53
End: 2023-09-15

## 2023-09-15 VITALS
HEART RATE: 62 BPM | SYSTOLIC BLOOD PRESSURE: 128 MMHG | DIASTOLIC BLOOD PRESSURE: 67 MMHG | TEMPERATURE: 97 F | OXYGEN SATURATION: 95 % | RESPIRATION RATE: 18 BRPM

## 2023-09-15 LAB
ALBUMIN SERPL ELPH-MCNC: 4.4 G/DL — SIGNIFICANT CHANGE UP (ref 3.3–5.2)
ALP SERPL-CCNC: 87 U/L — SIGNIFICANT CHANGE UP (ref 40–120)
ALT FLD-CCNC: 14 U/L — SIGNIFICANT CHANGE UP
ANION GAP SERPL CALC-SCNC: 15 MMOL/L — SIGNIFICANT CHANGE UP (ref 5–17)
AST SERPL-CCNC: 18 U/L — SIGNIFICANT CHANGE UP
BASOPHILS # BLD AUTO: 0.06 K/UL — SIGNIFICANT CHANGE UP (ref 0–0.2)
BASOPHILS NFR BLD AUTO: 1.1 % — SIGNIFICANT CHANGE UP (ref 0–2)
BILIRUB SERPL-MCNC: 0.2 MG/DL — LOW (ref 0.4–2)
BUN SERPL-MCNC: 15.7 MG/DL — SIGNIFICANT CHANGE UP (ref 8–20)
CALCIUM SERPL-MCNC: 10 MG/DL — SIGNIFICANT CHANGE UP (ref 8.4–10.5)
CHLORIDE SERPL-SCNC: 99 MMOL/L — SIGNIFICANT CHANGE UP (ref 96–108)
CO2 SERPL-SCNC: 23 MMOL/L — SIGNIFICANT CHANGE UP (ref 22–29)
CREAT SERPL-MCNC: 0.78 MG/DL — SIGNIFICANT CHANGE UP (ref 0.5–1.3)
EGFR: 91 ML/MIN/1.73M2 — SIGNIFICANT CHANGE UP
EOSINOPHIL # BLD AUTO: 0.07 K/UL — SIGNIFICANT CHANGE UP (ref 0–0.5)
EOSINOPHIL NFR BLD AUTO: 1.3 % — SIGNIFICANT CHANGE UP (ref 0–6)
GLUCOSE SERPL-MCNC: 107 MG/DL — HIGH (ref 70–99)
HCT VFR BLD CALC: 42.2 % — SIGNIFICANT CHANGE UP (ref 34.5–45)
HGB BLD-MCNC: 13.7 G/DL — SIGNIFICANT CHANGE UP (ref 11.5–15.5)
IMM GRANULOCYTES NFR BLD AUTO: 0.4 % — SIGNIFICANT CHANGE UP (ref 0–0.9)
LYMPHOCYTES # BLD AUTO: 1.5 K/UL — SIGNIFICANT CHANGE UP (ref 1–3.3)
LYMPHOCYTES # BLD AUTO: 28 % — SIGNIFICANT CHANGE UP (ref 13–44)
MAGNESIUM SERPL-MCNC: 2.3 MG/DL — SIGNIFICANT CHANGE UP (ref 1.6–2.6)
MCHC RBC-ENTMCNC: 29 PG — SIGNIFICANT CHANGE UP (ref 27–34)
MCHC RBC-ENTMCNC: 32.5 GM/DL — SIGNIFICANT CHANGE UP (ref 32–36)
MCV RBC AUTO: 89.2 FL — SIGNIFICANT CHANGE UP (ref 80–100)
MONOCYTES # BLD AUTO: 0.4 K/UL — SIGNIFICANT CHANGE UP (ref 0–0.9)
MONOCYTES NFR BLD AUTO: 7.5 % — SIGNIFICANT CHANGE UP (ref 2–14)
NEUTROPHILS # BLD AUTO: 3.31 K/UL — SIGNIFICANT CHANGE UP (ref 1.8–7.4)
NEUTROPHILS NFR BLD AUTO: 61.7 % — SIGNIFICANT CHANGE UP (ref 43–77)
PHOSPHATE SERPL-MCNC: 3.2 MG/DL — SIGNIFICANT CHANGE UP (ref 2.4–4.7)
PLATELET # BLD AUTO: 314 K/UL — SIGNIFICANT CHANGE UP (ref 150–400)
POTASSIUM SERPL-MCNC: 3.7 MMOL/L — SIGNIFICANT CHANGE UP (ref 3.5–5.3)
POTASSIUM SERPL-SCNC: 3.7 MMOL/L — SIGNIFICANT CHANGE UP (ref 3.5–5.3)
PROT SERPL-MCNC: 7 G/DL — SIGNIFICANT CHANGE UP (ref 6.6–8.7)
RBC # BLD: 4.73 M/UL — SIGNIFICANT CHANGE UP (ref 3.8–5.2)
RBC # FLD: 12.4 % — SIGNIFICANT CHANGE UP (ref 10.3–14.5)
SODIUM SERPL-SCNC: 137 MMOL/L — SIGNIFICANT CHANGE UP (ref 135–145)
WBC # BLD: 5.36 K/UL — SIGNIFICANT CHANGE UP (ref 3.8–10.5)
WBC # FLD AUTO: 5.36 K/UL — SIGNIFICANT CHANGE UP (ref 3.8–10.5)

## 2023-09-15 PROCEDURE — 85025 COMPLETE CBC W/AUTO DIFF WBC: CPT

## 2023-09-15 PROCEDURE — 99285 EMERGENCY DEPT VISIT HI MDM: CPT

## 2023-09-15 PROCEDURE — 84702 CHORIONIC GONADOTROPIN TEST: CPT

## 2023-09-15 PROCEDURE — 87640 STAPH A DNA AMP PROBE: CPT

## 2023-09-15 PROCEDURE — 83735 ASSAY OF MAGNESIUM: CPT

## 2023-09-15 PROCEDURE — 70496 CT ANGIOGRAPHY HEAD: CPT | Mod: MA

## 2023-09-15 PROCEDURE — 70498 CT ANGIOGRAPHY NECK: CPT | Mod: MA

## 2023-09-15 PROCEDURE — 93005 ELECTROCARDIOGRAM TRACING: CPT

## 2023-09-15 PROCEDURE — 80053 COMPREHEN METABOLIC PANEL: CPT

## 2023-09-15 PROCEDURE — 97163 PT EVAL HIGH COMPLEX 45 MIN: CPT

## 2023-09-15 PROCEDURE — 85610 PROTHROMBIN TIME: CPT

## 2023-09-15 PROCEDURE — 82962 GLUCOSE BLOOD TEST: CPT

## 2023-09-15 PROCEDURE — 80048 BASIC METABOLIC PNL TOTAL CA: CPT

## 2023-09-15 PROCEDURE — 84443 ASSAY THYROID STIM HORMONE: CPT

## 2023-09-15 PROCEDURE — 87641 MR-STAPH DNA AMP PROBE: CPT

## 2023-09-15 PROCEDURE — 85027 COMPLETE CBC AUTOMATED: CPT

## 2023-09-15 PROCEDURE — 37195 THROMBOLYTIC THERAPY STROKE: CPT

## 2023-09-15 PROCEDURE — 85730 THROMBOPLASTIN TIME PARTIAL: CPT

## 2023-09-15 PROCEDURE — 80076 HEPATIC FUNCTION PANEL: CPT

## 2023-09-15 PROCEDURE — 84100 ASSAY OF PHOSPHORUS: CPT

## 2023-09-15 PROCEDURE — G1002: CPT

## 2023-09-15 PROCEDURE — 83036 HEMOGLOBIN GLYCOSYLATED A1C: CPT

## 2023-09-15 PROCEDURE — 84484 ASSAY OF TROPONIN QUANT: CPT

## 2023-09-15 PROCEDURE — 36415 COLL VENOUS BLD VENIPUNCTURE: CPT

## 2023-09-15 PROCEDURE — 0042T: CPT | Mod: MA

## 2023-09-15 PROCEDURE — 70551 MRI BRAIN STEM W/O DYE: CPT

## 2023-09-15 PROCEDURE — 80061 LIPID PANEL: CPT

## 2023-09-15 PROCEDURE — 70450 CT HEAD/BRAIN W/O DYE: CPT | Mod: ME

## 2023-09-15 PROCEDURE — C8929: CPT

## 2023-09-15 PROCEDURE — 87086 URINE CULTURE/COLONY COUNT: CPT

## 2023-09-15 PROCEDURE — 81001 URINALYSIS AUTO W/SCOPE: CPT

## 2023-09-15 RX ORDER — METHOCARBAMOL 500 MG/1
1 TABLET, FILM COATED ORAL
Refills: 0 | DISCHARGE

## 2023-09-15 RX ADMIN — SERTRALINE 150 MILLIGRAM(S): 25 TABLET, FILM COATED ORAL at 12:54

## 2023-09-15 RX ADMIN — Medication 10 MILLIGRAM(S): at 12:54

## 2023-09-15 RX ADMIN — METHOCARBAMOL 500 MILLIGRAM(S): 500 TABLET, FILM COATED ORAL at 12:54

## 2023-09-15 RX ADMIN — GABAPENTIN 300 MILLIGRAM(S): 400 CAPSULE ORAL at 05:12

## 2023-09-15 RX ADMIN — LAMOTRIGINE 200 MILLIGRAM(S): 25 TABLET, ORALLY DISINTEGRATING ORAL at 05:12

## 2023-09-15 RX ADMIN — RISPERIDONE 2 MILLIGRAM(S): 4 TABLET ORAL at 12:54

## 2023-09-15 RX ADMIN — CHLORHEXIDINE GLUCONATE 1 APPLICATION(S): 213 SOLUTION TOPICAL at 06:15

## 2023-09-15 RX ADMIN — METHOCARBAMOL 500 MILLIGRAM(S): 500 TABLET, FILM COATED ORAL at 05:12

## 2023-09-15 RX ADMIN — Medication 81 MILLIGRAM(S): at 12:54

## 2023-09-15 RX ADMIN — GABAPENTIN 300 MILLIGRAM(S): 400 CAPSULE ORAL at 12:54

## 2023-09-15 NOTE — PROGRESS NOTE ADULT - PROVIDER SPECIALTY LIST ADULT
Hospitalist
Internal Medicine
Rehab Medicine
Rehab Medicine
Neurology
Internal Medicine
Neurology
Internal Medicine

## 2023-09-15 NOTE — DISCHARGE NOTE PROVIDER - NSDCMRMEDTOKEN_GEN_ALL_CORE_FT
amLODIPine 10 mg oral tablet: 1 tab(s) orally once a day  FLUoxetine 10 mg oral capsule: 1 cap(s) orally once a day  gabapentin 300 mg oral capsule: orally 300mg twice daily and 600mg at bedtime  lamoTRIgine 200 mg oral tablet: 1 tab(s) orally 2 times a day  methocarbamol 500 mg oral tablet: 1 tablet orally 3 times a day  Physical therapy: Physical therapy as outpatient  Protonix 40 mg oral delayed release tablet: 1 tab(s) orally once a day  Proventil HFA 90 mcg/inh inhalation aerosol: 2 puff(s) inhaled 4 times a day, As Needed  RisperDAL 2 mg oral tablet: orally 2mg in the day and 3mg at bedtime  Robaxin-750 oral tablet: 1 tab(s) orally 2 times a day  Zoloft 100 mg oral tablet: 1.5 tab(s) orally once a day   amLODIPine 10 mg oral tablet: 1 tab(s) orally once a day  FLUoxetine 10 mg oral capsule: 1 cap(s) orally once a day  gabapentin 300 mg oral capsule: orally 300mg twice daily and 600mg at bedtime  lamoTRIgine 200 mg oral tablet: 1 tab(s) orally 2 times a day  methocarbamol 500 mg oral tablet: 1 tablet orally 3 times a day  Physical therapy: Physical therapy as outpatient  Protonix 40 mg oral delayed release tablet: 1 tab(s) orally once a day  Proventil HFA 90 mcg/inh inhalation aerosol: 2 puff(s) inhaled 4 times a day, As Needed  RisperDAL 2 mg oral tablet: orally 2mg in the day and 3mg at bedtime  Robaxin-750 oral tablet: 1 tab(s) orally 2 times a day  Speech therapy outpatient: Speech therapy outpatient  Zoloft 100 mg oral tablet: 1.5 tab(s) orally once a day   amLODIPine 10 mg oral tablet: 1 tab(s) orally once a day  FLUoxetine 10 mg oral capsule: 1 cap(s) orally once a day  gabapentin 300 mg oral capsule: orally 300mg twice daily and 600mg at bedtime  lamoTRIgine 200 mg oral tablet: 1 tab(s) orally 2 times a day  Protonix 40 mg oral delayed release tablet: 1 tab(s) orally once a day  Proventil HFA 90 mcg/inh inhalation aerosol: 2 puff(s) inhaled 4 times a day, As Needed  RisperDAL 2 mg oral tablet: orally 2mg in the day and 3mg at bedtime  Zoloft 100 mg oral tablet: 1.5 tab(s) orally once a day

## 2023-09-15 NOTE — PROGRESS NOTE ADULT - ASSESSMENT
[facility]    [address] [facility] [address]    Phone:  [phone]       Thank You for choosing us for your health care visit. We are glad to serve you and happy to provide you with this summary of your visit. Please help us to ensure we have accurate records. If you find anything that needs to be changed, please let our staff know as soon as possible.          Your Demographic Information     Patient Name Sex Krystin Wyatt Female 2005       Ethnic Group Patient Race    Not of  or  Origin White      Your Visit Details     Date & Time Provider Department    2017 9:45 AM SHENG Guardado [facility]      We Ordered or Performed the Following     STREP GROUP A SCREEN RAPID WITH REFLEX TO CULTURE       Conditions Discussed Today or Order-Related Diagnoses        Comments    Strep pharyngitis    -  Primary     Sore throat           Your Vitals Were     BP Pulse Temp    90/54 (BP Location: RUE, Patient Position: Sitting, Cuff Size: Small Adult) 87 98.3 °F (36.8 °C)    Resp Weight Smoking Status    20 69 lb 8 oz (31.5 kg) (23 %, Z= -0.74)* Never Smoker    *Growth percentiles are based on CDC 2-20 Years data.      Medications Prescribed or Re-Ordered Today     amoxicillin (AMOXIL) 500 MG capsule    Sig - Route: Take 1 capsule by mouth 3 times daily. - Oral    Class: Eprescribe    Pharmacy: [id number] [address] Ph #: [phone]      Your Current Medications Are        Disp Refills Start End    amoxicillin (AMOXIL) 500 MG capsule 30 capsule 0 2017     Sig - Route: Take 1 capsule by mouth 3 times daily. - Oral    Class: Eprescribe    Acetaminophen (TYLENOL CHILDRENS PO)        Sig - Route: Take 1 tablet by mouth as needed. - Oral    Class: Historical Med    ibuprofen (IBUPROFEN CHILDRENS) 100 MG/5ML suspension        Sig - Route: Take by mouth every 8  53F with a history of bipolar disorder, asthma, hypertension, stroke/TIA, and pseudoseizures who presented with right sided weakness and difficulty speaking. CT of the head was without acute intracranial pathology and tenecteplase was administered. The patient was admitted to he Neurosurgical Intensive Care Unit. CT angiogram with no noted large vessel occlusion or flow limiting stenosis. Etiology concerning for left hemispheric dysfunction and given left facial palsy; possible multifocal or posterior circulation stroke.    MRI revealed cerebral hemispheric white matter lesions are nonspecific. In this age group the differential diagnosis is primarily between an early manifestation of ischemic white matter disease that would be advanced for age versus an inflammatory process. One or both etiologies could   contribute to this imaging appearance. The presence of a solitary pericallosal lesion is nonspecific, the overall pattern not strongly suggestive of demyelinating disease. The extent of this abnormality is mild-to-moderate.    #Pseudoseziures  #Psychiatric  -Both CT head clean  -MRI pending   -Extensive psychiatry history, including bipolar disorder and hx of pseudoseizures. Family confirms patient has experienced similar episodes a few years ago and is under the care of a psychiatrist.  -Psych recommended behavioral therapy, f/u outpatient psych     #Stroke/TIA  -MRI 9/14 revealed no acute cerebral infarction. Positive for white matter lesions possibly due to small vessel disease Recommend to follow closely. Symptoms concerning for tenecteplase averted stroke. Etiology embolic stroke of undetermined source.   -C/w monitoring for neurologic deterioration, stroke neuro checks q4hr  -Antithrombotic tx asa 81 mg daily  -Outpatient neurology follow up for noted and history. No driving.  -Dysphagia screen passed, advance diet as tolerated per protocol    - Initial CT of the head was unremarkable  - repeat CT head 9/13/23 unremarkable  -Dysphagia screen passed, advance diet as tolerated per protocol    -titrate statin to LDL goal less than 70  - Physical Therapy and Physiatry evaluation noted, disposition pending clinical course    #Bipolar disorder  - On fluoxetine and sertraline at the home dose  - Continue on lamotrigine and risperidone    #CV  #Hypertension  -Permissive HTN; overall < 180/105mmHg , currently 120-140mmHg and neurologically tolerating can maintain this SBP goal for now  -cardiac monitoring w/ telemetry for now, further evaluation pending findings of noted workup  -30 day MCOT - further evaluation pending these findings.    -titrate statin to LDL goal less than 70  - Permissive hypertension    #Headache  - Acetaminophen/butalbital/caffeine as needed    Dispo rehab or home depending on PT eval once stable and workup is complete   hours as needed for Fever. - Oral    Class: Historical Med    Pediatric Multivit-Minerals-C (CHILDRENS MULTIVITAMIN) 60 MG Chew Tab        Sig - Route: Chew by mouth daily. - Oral    Class: Historical Med      Allergies     Seasonal Other (See Comments)    Itchy eyes, sneezing      Immunizations History as of 2/6/2017     Name Date    DTaP 7/22/2011, 6/22/2008, 8/6/2007, 2006, 2006    HEPATITIS A CHILD 7/3/2008, 6/28/2007    HIB 8/6/2007, 2006, 2006, 2006    Hepatitis B Child 2006, 2006, 2006    Influenza Quadrivalent Preservative Free 11/12/2014    MMR 8/6/2007    MMRV 7/22/2011    Pneumococcal Conjugate 13 Valent 10/25/2007, 2006, 2006, 2006    Polio, INACTIVATED 7/22/2011, 2006, 2006, 2006    Varicella 7/22/2011, 6/28/2007      Results of Testing Done Today     STREP GROUP A SCREEN RAPID WITH REFLEX TO CULTURE      Component    RAPID STREP GROUP A    positive                          Patient Instructions      Pharyngitis: Strep Confirmed (Child)  Pharyngitis is a sore throat. Sore throat is a common condition in children. It can be caused by an infection with the bacterium streptococcus. This is commonly known as strep throat.  Strep throat starts suddenly. Symptoms include a red, swollen throat and swollen lymph nodes, which make it painful to swallow. Red spots may appear on the roof of the mouth. Some children will be flushed and have a fever. Young children may not show that they feel pain. But they may refuse to eat or drink or drool a lot.  Testing has confirmed strep throat. Antibiotic treatment has been prescribed. This treatment may be given by injection or pills. Children with strep throat are contagious until they have been taking an antibiotic for 24 hours.   Home care  Follow these guidelines when caring for your child at home:  · The health care provider has prescribed an antibiotic to treat the infection and possibly medicine  to treat a fever. Follow the provider’s instructions for giving these medicines to your child. Make sure your child takes the medication every day until it is gone. You should not have any left over. If your child has pain or fever, you can give him or her medication as advised by the health care provider. Do not give your child aspirin. Do not give your child any other medication without first asking the health care provider. If your child received an antibiotic shot, no more antibiotics should be needed.  · Wash your hands with warm water and soap before and after caring for your child. This is to help prevent the spread of infection. Others should do the same.  · Limit your child's contact with others until he or she is no longer contagious (for 24 hours after starting antibiotics). Keep him or her home from school or .  · Give your child plenty of time to rest.  · Encourage your child to drink liquids. Some children may prefer ice chips, cold drinks, frozen desserts, or popsicles. Others may also like warm chicken soup or beverages with lemon and honey. Don’t force your child to eat.  · Have your child gargle with warm salt water to ease throat pain. The gargle should be spit out afterward, not swallowed.   ·  Avoid salty or spicy foods, which can irritate the throat.  · Tell people who may have had contact with your child about his or her illness. This may include school officials,  center workers, or others.   Follow-up care  Follow up with your child’s health care provider, or as advised.  When to seek medical advice  Unless your child's healthcare provider advises otherwise, call the provider right away if:  · Your child is younger than 2 years of age and has a fever of 100.4°F (38°C) that continues for more than 1 day.  · Your child is 2 years old or older and has a fever of 100.4°F (38°C) that continues for more than 3 days.  · Your child is of any age and has repeated fevers above 104°F  (40°C).  Also call your child's provider right away if any of these occur:  · Symptoms don’t get better after taking prescribed medication or appear to be getting worse  · New or worsening ear pain, sinus pain, or headache  · Painful lumps in the back of neck  · Stiff neck  · Lymph nodes are getting larger   · Inability to swallow liquids, excessive drooling, or inability to open mouth wide due to throat pain  · Signs of dehydration (very dark urine or no urine, sunken eyes, dizziness)  · Trouble breathing or noisy breathing  · Muffled voice  · New rash  © 3347-2152 [employer]. [address] [place] [address]. All rights reserved. This information is not intended as a substitute for professional medical care. Always follow your healthcare professional's instructions.      [facility]     Monday - Thursday 8 a.m. - 8 p.m.  Friday   8 a.m. - 6 p.m.  Saturday  8 a.m. - 4 p.m.  ----------------  [email or web address] -- See current wait times for selected [facility] in real-time, and reserve your spot in line! For your convenience, we'll text you if the wait times are long so that you can do your waiting at home, work, or wherever you prefer!  ----------------  Thank you for choosing [facility] today. We hope you had a pleasant experience and we look forward to serving your future needs.     If you receive a survey in the mail about today's services, we hope that you will take a few minutes to let us know about your experience.  --------------------------------------------------------------------------------------------------------------  UNLESS OTHERWISE INSTRUCTED BY YOUR URGENT CARE PROVIDER TODAY, all follow-up for your medical issues should be managed by your primary care provider. The Urgent Care does not manage chronic medical issues or refill medications. You are responsible for scheduling and keeping any necessary follow-up visits  53F with a history of bipolar disorder, asthma, hypertension, stroke/TIA, and pseudoseizures who presented with right sided weakness and difficulty speaking. CT of the head was without acute intracranial pathology and tenecteplase was administered. The patient was admitted to he Neurosurgical Intensive Care Unit. CT angiogram with no noted large vessel occlusion or flow limiting stenosis. Etiology concerning for left hemispheric dysfunction and given left facial palsy; possible multifocal or posterior circulation stroke.    MRI revealed cerebral hemispheric white matter lesions are nonspecific. In this age group the differential diagnosis is primarily between an early manifestation of ischemic white matter disease that would be advanced for age versus an inflammatory process. One or both etiologies could   contribute to this imaging appearance. The presence of a solitary pericallosal lesion is nonspecific, the overall pattern not strongly suggestive of demyelinating disease. The extent of this abnormality is mild-to-moderate.    #Pseudoseziures  #Psychiatric  -Both CT head clean  -MRI pending   -Extensive psychiatry history, including bipolar disorder and hx of pseudoseizures. Family confirms patient has experienced similar episodes a few years ago and is under the care of a psychiatrist.  -Psych recommended behavioral therapy, f/u outpatient psych     #Stroke/TIA  -MRI 9/14 revealed no acute cerebral infarction. Positive for white matter lesions possibly due to small vessel disease Recommend to follow closely. Symptoms concerning for tenecteplase averted stroke. Etiology embolic stroke of undetermined source.   -C/w monitoring for neurologic deterioration  -Antithrombotic tx asa 81 mg daily  -Outpatient neurology follow up for noted and history. No driving.  -Dysphagia screen passed, advance diet as tolerated per protocol    - Initial CT of the head was unremarkable  - repeat CT head 9/13/23 unremarkable  -Dysphagia screen passed, advance diet as tolerated per protocol    -titrate statin to LDL goal less than 70    #Bipolar disorder  - On fluoxetine and sertraline at the home dose  - Continue on lamotrigine and risperidone    #CV  #Hypertension  -Permissive HTN; overall < 180/105mmHg , currently 120-140mmHg and neurologically tolerating can maintain this SBP goal for now  -cardiac monitoring w/ telemetry for now  -30 day MCOT - further evaluation pending these findings.    -titrate statin to LDL goal less than 70  - Permissive hypertension    #Headache  - Acetaminophen/butalbital/caffeine as needed    Dispo rehab or home depending on PT eval once stable and workup is complete   with your primary care provider after this visit today.   --------------------------------------------------------------------------------------------------------------  IF YOU WERE PRESCRIBED AN ANTIBIOTIC TODAY: We recommend taking an over-the-counter probiotic (Such as Florajen 3 for adults, or [id number] for children -- AVAILABLE IN THE [facility] and other local pharmacies too) once a day for the entire duration of your antibiotics, and continuing it for 2 weeks after the antibiotics are finished. This will help reduce your chance of developing antibiotic-related diarrhea and/or yeast infections.  --------------------------------------------------------------------------------------------------------------  IF YOU HAVE LAB RESULTS or XRAY REPORTS STILL PENDING: We typically call patients back only if (1) the results are \"significantly abnormal\", (2) we need to change your treatment plan based on the results, or (3) the report is different than what we told you during your visit. If we have not called you about your results 48 hours after your visit, you can call us at [phone] to check on the status.  --------------------------------------------------------------------------------------------------------------

## 2023-09-15 NOTE — DISCHARGE NOTE PROVIDER - HOSPITAL COURSE
53F with a history of bipolar disorder, asthma, hypertension, stroke/TIA, and pseudoseizures who presented with right sided weakness and difficulty speaking. CT of the head was without acute intracranial pathology and tenecteplase was administered. The patient was admitted to he Neurosurgical Intensive Care Unit. CT angiogram with no noted large vessel occlusion or flow limiting stenosis. Etiology concerning for left hemispheric dysfunction and given left facial palsy; possible multifocal or posterior circulation stroke.    MRI revealed cerebral hemispheric white matter lesions are nonspecific. In this age group the differential diagnosis is primarily between an early manifestation of ischemic white matter disease that would be advanced for age versus an inflammatory process. One or both etiologies could   contribute to this imaging appearance. The presence of a solitary pericallosal lesion is nonspecific, the overall pattern not strongly suggestive of demyelinating disease. The extent of this abnormality is mild-to-moderate.     #Pseudoseziures  #Psychiatric  -Both CT head clean done and results as above   -Psych recommended behavioral therapy, f/u outpatient psych     #Stroke/TIA  -MRI 9/14 revealed no acute cerebral infarction. Positive for white matter lesions possibly due to small vessel disease Recommend to follow closely. Symptoms concerning for tenecteplase averted stroke. Etiology embolic stroke of undetermined source.   -C/w monitoring for neurologic deterioration  -Antithrombotic tx asa 81 mg daily  -Outpatient neurology follow up for noted and history. No driving.  -Dysphagia screen passed, advance diet as tolerated per protocol      #Bipolar disorder  - On fluoxetine and sertraline at the home dose  - Continue on lamotrigine and risperidone    #Hypertension      #Headache  - Acetaminophen/butalbital/caffeine as needed

## 2023-09-15 NOTE — DISCHARGE NOTE PROVIDER - CARE PROVIDER_API CALL
Magan Albarado  Neurology  [address] [place] [address], [facility] [address]  [place] [address]  Phone: [phone]  Fax: [phone]  Follow Up Time:

## 2023-09-15 NOTE — DISCHARGE NOTE PROVIDER - NSDCFUSCHEDAPPT_GEN_ALL_CORE_FT
[facility]  [facility] [address]  Scheduled Appointment: 10/06/2023    [facility]  [facility] [address]  Scheduled Appointment: 11/28/2023
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
## 2023-09-18 RX ORDER — ASPIRIN/CALCIUM CARB/MAGNESIUM 324 MG
1 TABLET ORAL
Qty: 30 | Refills: 0
Start: 2023-09-18 | End: 2023-10-17

## 2023-09-18 RX ORDER — ATORVASTATIN CALCIUM 80 MG/1
1 TABLET, FILM COATED ORAL
Qty: 30 | Refills: 0
Start: 2023-09-18 | End: 2023-10-17

## 2023-10-05 ENCOUNTER — RX RENEWAL (OUTPATIENT)
Age: 53
End: 2023-10-05

## 2023-10-06 ENCOUNTER — APPOINTMENT (OUTPATIENT)
Dept: UROGYNECOLOGY | Facility: CLINIC | Age: 53
End: 2023-10-06

## 2023-11-01 ENCOUNTER — RX RENEWAL (OUTPATIENT)
Age: 53
End: 2023-11-01

## 2023-11-28 ENCOUNTER — APPOINTMENT (OUTPATIENT)
Dept: UROGYNECOLOGY | Facility: CLINIC | Age: 53
End: 2023-11-28
Payer: COMMERCIAL

## 2023-11-28 VITALS
SYSTOLIC BLOOD PRESSURE: 134 MMHG | WEIGHT: 218 LBS | DIASTOLIC BLOOD PRESSURE: 80 MMHG | HEIGHT: 61 IN | BODY MASS INDEX: 41.16 KG/M2

## 2023-11-28 DIAGNOSIS — R31.0 GROSS HEMATURIA: ICD-10-CM

## 2023-11-28 LAB
BILIRUB UR QL STRIP: NEGATIVE
CLARITY UR: CLEAR
COLLECTION METHOD: NORMAL
GLUCOSE UR-MCNC: NEGATIVE
HCG UR QL: 0.2 EU/DL
HGB UR QL STRIP.AUTO: NORMAL
KETONES UR-MCNC: NEGATIVE
LEUKOCYTE ESTERASE UR QL STRIP: NEGATIVE
NITRITE UR QL STRIP: NEGATIVE
PH UR STRIP: 6
PROT UR STRIP-MCNC: NEGATIVE
SP GR UR STRIP: 1.03

## 2023-11-28 PROCEDURE — 51701 INSERT BLADDER CATHETER: CPT | Mod: 59

## 2023-11-28 PROCEDURE — 99214 OFFICE O/P EST MOD 30 MIN: CPT | Mod: 25

## 2023-11-28 PROCEDURE — 51798 US URINE CAPACITY MEASURE: CPT

## 2023-11-28 PROCEDURE — 81003 URINALYSIS AUTO W/O SCOPE: CPT | Mod: QW

## 2023-11-28 RX ORDER — VIBEGRON 75 MG/1
75 TABLET, FILM COATED ORAL
Qty: 90 | Refills: 3 | Status: ACTIVE | COMMUNITY
Start: 2023-11-28 | End: 1900-01-01

## 2023-11-29 LAB
APPEARANCE: ABNORMAL
BACTERIA: ABNORMAL /HPF
BILIRUBIN URINE: NEGATIVE
BLOOD URINE: NEGATIVE
COLOR: NORMAL
GLUCOSE QUALITATIVE U: NEGATIVE
KETONES URINE: NEGATIVE
LEUKOCYTE ESTERASE URINE: NEGATIVE
MICROSCOPIC-UA: NORMAL
NITRITE URINE: POSITIVE
PH URINE: 6
PROTEIN URINE: NEGATIVE
RED BLOOD CELLS URINE: 0 /HPF
SPECIFIC GRAVITY URINE: >=1.03
SQUAMOUS EPITHELIAL CELLS: PRESENT
URINE COMMENTS: NORMAL
UROBILINOGEN URINE: NORMAL
WHITE BLOOD CELLS URINE: 0 /HPF

## 2023-12-04 LAB — BACTERIA UR CULT: NORMAL

## 2023-12-05 ENCOUNTER — RX RENEWAL (OUTPATIENT)
Age: 53
End: 2023-12-05

## 2023-12-05 LAB — URINE CYTOLOGY: NORMAL

## 2023-12-05 RX ORDER — METHOCARBAMOL 750 MG/1
750 TABLET, FILM COATED ORAL
Qty: 60 | Refills: 1 | Status: ACTIVE | COMMUNITY
Start: 2023-02-01 | End: 1900-01-01

## 2023-12-06 ENCOUNTER — APPOINTMENT (OUTPATIENT)
Dept: CT IMAGING | Facility: CLINIC | Age: 53
End: 2023-12-06

## 2023-12-11 ENCOUNTER — RESULT REVIEW (OUTPATIENT)
Age: 53
End: 2023-12-11

## 2023-12-14 ENCOUNTER — APPOINTMENT (OUTPATIENT)
Dept: MRI IMAGING | Facility: CLINIC | Age: 53
End: 2023-12-14
Payer: COMMERCIAL

## 2023-12-14 ENCOUNTER — OUTPATIENT (OUTPATIENT)
Dept: OUTPATIENT SERVICES | Facility: HOSPITAL | Age: 53
LOS: 1 days | End: 2023-12-14
Payer: COMMERCIAL

## 2023-12-14 DIAGNOSIS — Z90.711 ACQUIRED ABSENCE OF UTERUS WITH REMAINING CERVICAL STUMP: Chronic | ICD-10-CM

## 2023-12-14 DIAGNOSIS — R31.0 GROSS HEMATURIA: ICD-10-CM

## 2023-12-14 PROCEDURE — 74183 MRI ABD W/O CNTR FLWD CNTR: CPT

## 2023-12-14 PROCEDURE — A9585: CPT

## 2023-12-14 PROCEDURE — 74183 MRI ABD W/O CNTR FLWD CNTR: CPT | Mod: 26

## 2023-12-14 PROCEDURE — 72197 MRI PELVIS W/O & W/DYE: CPT

## 2023-12-14 PROCEDURE — 72197 MRI PELVIS W/O & W/DYE: CPT | Mod: 26

## 2023-12-26 ENCOUNTER — APPOINTMENT (OUTPATIENT)
Dept: UROGYNECOLOGY | Facility: CLINIC | Age: 53
End: 2023-12-26
Payer: COMMERCIAL

## 2023-12-26 LAB
BILIRUB UR QL STRIP: NORMAL
CLARITY UR: CLEAR
COLLECTION METHOD: NORMAL
GLUCOSE UR-MCNC: NEGATIVE
HCG UR QL: 0.2 EU/DL
HGB UR QL STRIP.AUTO: NORMAL
KETONES UR-MCNC: NEGATIVE
LEUKOCYTE ESTERASE UR QL STRIP: NORMAL
NITRITE UR QL STRIP: NEGATIVE
PH UR STRIP: 5.5
PROT UR STRIP-MCNC: NEGATIVE
SP GR UR STRIP: 1.02

## 2023-12-26 PROCEDURE — 52000 CYSTOURETHROSCOPY: CPT

## 2023-12-26 PROCEDURE — 81003 URINALYSIS AUTO W/O SCOPE: CPT | Mod: QW

## 2024-01-09 ENCOUNTER — APPOINTMENT (OUTPATIENT)
Dept: UROGYNECOLOGY | Facility: CLINIC | Age: 54
End: 2024-01-09
Payer: COMMERCIAL

## 2024-01-09 PROCEDURE — 51784 ANAL/URINARY MUSCLE STUDY: CPT

## 2024-01-09 PROCEDURE — 51797 INTRAABDOMINAL PRESSURE TEST: CPT

## 2024-01-09 PROCEDURE — 51741 ELECTRO-UROFLOWMETRY FIRST: CPT

## 2024-01-09 PROCEDURE — 51729 CYSTOMETROGRAM W/VP&UP: CPT

## 2024-03-01 RX ORDER — MIRABEGRON 25 MG/1
25 TABLET, FILM COATED, EXTENDED RELEASE ORAL
Qty: 30 | Refills: 2 | Status: ACTIVE | COMMUNITY
Start: 2024-03-01 | End: 1900-01-01

## 2024-03-04 RX ORDER — TROSPIUM CHLORIDE 60 MG/1
60 CAPSULE, EXTENDED RELEASE ORAL
Qty: 30 | Refills: 0 | Status: ACTIVE | COMMUNITY
Start: 2024-03-04 | End: 1900-01-01

## 2024-03-12 ENCOUNTER — APPOINTMENT (OUTPATIENT)
Dept: UROGYNECOLOGY | Facility: CLINIC | Age: 54
End: 2024-03-12

## 2024-03-29 RX ORDER — SOLIFENACIN SUCCINATE 5 MG/1
5 TABLET ORAL
Qty: 30 | Refills: 0 | Status: ACTIVE | COMMUNITY
Start: 2024-03-29 | End: 1900-01-01

## 2024-05-01 ENCOUNTER — NON-APPOINTMENT (OUTPATIENT)
Age: 54
End: 2024-05-01

## 2024-05-18 ENCOUNTER — EMERGENCY (EMERGENCY)
Facility: HOSPITAL | Age: 54
LOS: 1 days | Discharge: HOME | End: 2024-05-18
Attending: EMERGENCY MEDICINE
Payer: COMMERCIAL

## 2024-05-18 VITALS
OXYGEN SATURATION: 97 % | HEART RATE: 77 BPM | DIASTOLIC BLOOD PRESSURE: 67 MMHG | WEIGHT: 145.06 LBS | RESPIRATION RATE: 18 BRPM | SYSTOLIC BLOOD PRESSURE: 128 MMHG | TEMPERATURE: 99 F

## 2024-05-18 VITALS
RESPIRATION RATE: 18 BRPM | OXYGEN SATURATION: 99 % | TEMPERATURE: 98 F | SYSTOLIC BLOOD PRESSURE: 101 MMHG | HEART RATE: 73 BPM | DIASTOLIC BLOOD PRESSURE: 63 MMHG

## 2024-05-18 DIAGNOSIS — Z90.711 ACQUIRED ABSENCE OF UTERUS WITH REMAINING CERVICAL STUMP: Chronic | ICD-10-CM

## 2024-05-18 PROCEDURE — 99284 EMERGENCY DEPT VISIT MOD MDM: CPT

## 2024-05-18 PROCEDURE — 99282 EMERGENCY DEPT VISIT SF MDM: CPT

## 2024-05-18 NOTE — ED ADULT NURSE REASSESSMENT NOTE - NS ED NURSE REASSESS COMMENT FT1
spoke to wisdom, the  for a 2 time informing the patients discharge. was told to wait for a call from [facility]. all information necessary was given.

## 2024-05-18 NOTE — ED ADULT NURSE NOTE - OBJECTIVE STATEMENT
patient had a witnessed seizure from [facility] facility. patient is alert and oriented now and offers no complaints.

## 2024-05-18 NOTE — ED ADULT TRIAGE NOTE - CHIEF COMPLAINT QUOTE
desmond from [facility], witnessed "5 grand mal seizures". Received valium 10mg im at facility. A&ox4 rr even and unlabored at this time without complains, on gabapentin and phenobarbital for seizures

## 2024-05-18 NOTE — ED PROVIDER NOTE - OBJECTIVE STATEMENT
53 yo female  with known history of bipolar disorder, pseudo seizures, EtOH abuse and status post CVA 11/23 for which she received tPA at [facility] presents to ED via EMS after reportedly having 4 "seizures" while at [facility]  patient was admitted to [facility] 2 days ago for alcohol detox for which she has been receiving Ativan and phenobarbital.   patient received IM Valium by staff at [facility] for reported seizure.  On arrival patient awake and alert slightly sleepy but in no distress.  Patient denies any tongue biting or incontinence and there was no reported postictal episode.   patient denies any complaints at this time.    MRI revealed cerebral hemispheric white matter lesions are nonspecific. In this age group the differential diagnosis is primarily between an early manifestation of ischemic white matter disease that would be advanced for age versus an inflammatory process. One or both etiologies could   contribute to this imaging appearance. The presence of a solitary pericallosal lesion is nonspecific, the overall pattern not strongly suggestive of demyelinating disease. The extent of this abnormality is mild-to-moderate.     #Pseudoseziures  #Psychiatric  -Both CT head clean done and results as above   -Psych recommended behavioral therapy, f/u outpatient psych     #Stroke/TIA  -MRI 9/14 revealed no acute cerebral infarction. Positive for white matter lesions possibly due to small vessel disease Recommend to follow closely. Symptoms concerning for tenecteplase averted stroke. Etiology embolic stroke of undetermined source.   -C/w monitoring for neurologic deterioration  -Antithrombotic tx asa 81 mg daily  -Outpatient neurology follow up for noted and history. No driving.  -Dysphagia screen passed, advance diet as tolerated per protocol 53 yo female  with known history of bipolar disorder, pseudo seizures, EtOH abuse and status post CVA 11/23 for which she received tPA at [facility] presents to ED via EMS after reportedly having 4 "seizures" while at [facility]  patient was admitted to [facility] 2 days ago for alcohol detox for which she has been receiving Ativan and phenobarbital.   patient received IM Valium by staff at [facility] for reported seizure.  On arrival patient awake and alert slightly sleepy but in no distress.  Patient denies any tongue biting or incontinence and there was no reported postictal episode.   patient denies any complaints at this time.

## 2024-05-18 NOTE — ED PROVIDER NOTE - BIRTH SEX
EICU BRIEF ADMIT NOTE:     HISTORY:  Yes Please refer to H/P and ER notes for detail   56 y.o. male with PMH of drug addiction, ETOH abuse, Bipolar DO,   COPD, PTSD, chronic back pain who presents to the ED for evaluation of abdominal pain, nausea, and vomiting.  He drinks ETOH daily (1/2 gallon of Vodka). His last drink was yesterday PM at about 7:30 but he is having difficulty holding fluids down.      CAMERA ASSESSMENT: Two way audiovisual assessment was done:   Discussed with RN, stable VS  On ciwa protocol.    Data:   Telemetry was reviewed. Medical records including notes, labs and imaging were reviewed.   plt 93K  AST//69 Mag low at 1.2    Drug screen: benzos +  X ray shoulder no #  CxR neg  CT abd/pelvis:  Hepatic steatosis, ? Mild colitis. Diverticulosis.   DISCUSSED with bedside nurse.   CTH neg  EKG: sinus, prolonged qtc, short WY from low mag.   Covid/flu neg.  UA neg for uti    ASSESSMENT AND PLAN:     # Alcohol withdrawals. Hypomagnesemia, steatosis.qtc prolongation  - s/p 1 lit fluids, 2 gm mag so far. Getting banana bag.  - will repeat 2 gm mag again, follow Mag/po4 ( to AM lab)  - asp and sz precautions    # Diverticulosis. ? Colitis. Wbc normal. Watch for now.  - watch for fever.   - on PPI.     BEST PRACTICES REVIEW:     GLYCEMIN CONTROL:  Diabetes: No  STRESS ULCER PROPHYLAXIS: PPI  DVT PROPHYLAXIS:  low risk for now           Thank You for allowing EICU to participate in the care of the patient. Please call as needed        Alphonse Lamar MD, [facility]  EICU  Critical Care Medicine      
Intervention Initiated From:  COR / EICU    Sierra intervened regarding:  Rounding (Video assessment)    Nurse Notified:  No    Doctor Notified:  No    Comments: Rounding done. Alert. Calm. No c/o voiced. On 2 liter nasal cannula. On IV fluid @ 100/hour. B/P 131/83, HR 78, Resp 42, sat 91. Up to side of bed  
Female

## 2024-05-18 NOTE — ED PROVIDER NOTE - PATIENT PORTAL LINK FT
You can access the FollowMyHealth Patient Portal offered by [facility] by registering at the following website: [email or web address]. By joining [employer]’s FollowMyHealth portal, you will also be able to view your health information using other applications (apps) compatible with our system.

## 2024-05-18 NOTE — ED ADULT NURSE NOTE - NSFALLUNIVINTERV_ED_ALL_ED
Bed/Stretcher in lowest position, wheels locked, appropriate side rails in place/Call bell, personal items and telephone in reach/Instruct patient to call for assistance before getting out of bed/chair/stretcher/Non-slip footwear applied when patient is off stretcher/[place] to call system/Physically safe environment - no spills, clutter or unnecessary equipment/Purposeful proactive rounding/Room/bathroom lighting operational, light cord in reach

## 2024-05-18 NOTE — ED PROVIDER NOTE - WET READ LAUNCH FT
In-basket message received from Dr Rg Owens to add patient to next available upper GI [facility]  Patient placed for presentation on 5/18/2023  Braulio Jj Chart reviewed and prep completed  There are no Wet Read(s) to document.

## 2024-05-18 NOTE — ED PROVIDER NOTE - CLINICAL SUMMARY MEDICAL DECISION MAKING FREE TEXT BOX
53 yo female  with known history of bipolar disorder, pseudo seizures, EtOH abuse and status post CVA 11/23 for which she received tPA at [facility] presents to ED via EMS after reportedly having 4 "seizures" while at [facility]  patient was admitted to [facility] 2 days ago for alcohol detox for which she has been receiving Ativan and phenobarbital.   patient received IM Valium by staff at [facility] for reported seizure.  On arrival patient awake and alert slightly sleepy but in no distress.  Patient denies any tongue biting or incontinence and there was no reported postictal episode.   patient denies any complaints at this time.  Patient awake and alert this time without any complaints, no intervention is required.  Case discussed with GOPI Keller at [facility] and will observe patient and call for transport back to Center so patient can resume her treatment and care

## 2024-06-11 RX ORDER — CEPHALEXIN 500 MG/1
500 CAPSULE ORAL
Qty: 10 | Refills: 0 | Status: ACTIVE | COMMUNITY
Start: 2024-06-11 | End: 1900-01-01

## 2024-06-18 ENCOUNTER — APPOINTMENT (OUTPATIENT)
Dept: UROGYNECOLOGY | Facility: CLINIC | Age: 54
End: 2024-06-18
Payer: COMMERCIAL

## 2024-06-18 LAB
BILIRUB UR QL STRIP: NEGATIVE
CLARITY UR: CLEAR
COLLECTION METHOD: NORMAL
GLUCOSE UR-MCNC: NEGATIVE
HCG UR QL: 0.2 EU/DL
HGB UR QL STRIP.AUTO: NEGATIVE
KETONES UR-MCNC: NEGATIVE
LEUKOCYTE ESTERASE UR QL STRIP: NEGATIVE
NITRITE UR QL STRIP: NEGATIVE
PH UR STRIP: 6
PROT UR STRIP-MCNC: NEGATIVE
SP GR UR STRIP: 1.02

## 2024-06-18 PROCEDURE — 81003 URINALYSIS AUTO W/O SCOPE: CPT | Mod: QW

## 2024-06-18 PROCEDURE — 52287 CYSTOSCOPY CHEMODENERVATION: CPT

## 2024-06-24 ENCOUNTER — EMERGENCY (EMERGENCY)
Facility: HOSPITAL | Age: 54
LOS: 1 days | Discharge: HOME | End: 2024-06-24
Attending: EMERGENCY MEDICINE
Payer: COMMERCIAL

## 2024-06-24 ENCOUNTER — APPOINTMENT (OUTPATIENT)
Dept: UROGYNECOLOGY | Facility: CLINIC | Age: 54
End: 2024-06-24
Payer: COMMERCIAL

## 2024-06-24 VITALS
TEMPERATURE: 97.7 F | HEART RATE: 69 BPM | OXYGEN SATURATION: 98 % | DIASTOLIC BLOOD PRESSURE: 60 MMHG | SYSTOLIC BLOOD PRESSURE: 92 MMHG

## 2024-06-24 VITALS
SYSTOLIC BLOOD PRESSURE: 108 MMHG | HEART RATE: 55 BPM | WEIGHT: 179.02 LBS | HEIGHT: 61 IN | RESPIRATION RATE: 18 BRPM | DIASTOLIC BLOOD PRESSURE: 72 MMHG | TEMPERATURE: 98 F | OXYGEN SATURATION: 94 %

## 2024-06-24 DIAGNOSIS — Z90.711 ACQUIRED ABSENCE OF UTERUS WITH REMAINING CERVICAL STUMP: Chronic | ICD-10-CM

## 2024-06-24 DIAGNOSIS — R39.198 OTHER DIFFICULTIES WITH MICTURITION: ICD-10-CM

## 2024-06-24 DIAGNOSIS — R10.2 PELVIC AND PERINEAL PAIN: ICD-10-CM

## 2024-06-24 LAB
BILIRUB UR QL STRIP: NORMAL
CLARITY UR: CLEAR
COLLECTION METHOD: NORMAL
GLUCOSE UR-MCNC: NEGATIVE
HCG UR QL: 0.2 EU/DL
HGB UR QL STRIP.AUTO: NEGATIVE
KETONES UR-MCNC: NEGATIVE
LEUKOCYTE ESTERASE UR QL STRIP: NORMAL
NITRITE UR QL STRIP: NEGATIVE
PH UR STRIP: 5.5
PROT UR STRIP-MCNC: NEGATIVE
SP GR UR STRIP: 1.02

## 2024-06-24 PROCEDURE — 99214 OFFICE O/P EST MOD 30 MIN: CPT | Mod: 25

## 2024-06-24 PROCEDURE — 93010 ELECTROCARDIOGRAM REPORT: CPT

## 2024-06-24 PROCEDURE — 93005 ELECTROCARDIOGRAM TRACING: CPT

## 2024-06-24 PROCEDURE — 51701 INSERT BLADDER CATHETER: CPT

## 2024-06-24 PROCEDURE — 51798 US URINE CAPACITY MEASURE: CPT

## 2024-06-24 PROCEDURE — 99283 EMERGENCY DEPT VISIT LOW MDM: CPT

## 2024-06-24 PROCEDURE — 81003 URINALYSIS AUTO W/O SCOPE: CPT | Mod: QW

## 2024-06-24 PROCEDURE — 99283 EMERGENCY DEPT VISIT LOW MDM: CPT | Mod: 25

## 2024-06-25 ENCOUNTER — APPOINTMENT (OUTPATIENT)
Dept: UROGYNECOLOGY | Facility: CLINIC | Age: 54
End: 2024-06-25

## 2024-06-25 ENCOUNTER — NON-APPOINTMENT (OUTPATIENT)
Age: 54
End: 2024-06-25

## 2024-06-26 LAB — BACTERIA UR CULT: NORMAL

## 2024-07-10 NOTE — ED PROVIDER NOTE - CADM POA URETHRAL CATHETER
[facility]   CARDIOVASCULAR THORACIC UNIT (CVTU)  HISTORY AND PHYSICAL EXAM      Patient Name: Fernanda Chamberlain    : 1952  MRN: [id number]  Admit Date: 2024   Length of Stay: 1 Days    24 Hour Events  Plan for CABG tomorrow. Remains on 2 L N/C.     Past Medical, Surgical, Family and Social History  Patient has a past medical history of Essential (primary) hypertension, Gastroesophageal reflux disease, High cholesterol, and Sleep apnea.    Patient has a past surgical history that includes fracture surgery (Left) and  section, classic.    Patient family history is not on file.    Patient reports that she has never smoked. She has never used smokeless tobacco. She reports that she does not currently use alcohol. She reports that she does not use drugs.    Allergies  Patient has No Known Allergies.    Medications:  Current Facility-Administered Medications   Medication    sodium chloride 0.9% infusion    hydrALAZINE (APRESOLINE) injection 10 mg    dextrose 50 % injection 25 g    dextrose 50 % injection 12.5 g    glucagon (GLUCAGEN) injection 1 mg    dextrose (GLUTOSE) 40 % gel 15 g    dextrose (GLUTOSE) 40 % gel 30 g    insulin lispro (ADMELOG,HumaLOG) - Correction Dose    insulin lispro (ADMELOG,HumaLOG) - Correction Dose    aspirin chewable 81 mg    gabapentin (NEURONTIN) capsule 300 mg    furosemide (LASIX) 250 mg in NaCl 0.9% 125 mL infusion    chlorhexidine gluconate (PERIDEX) 0.12 % solution 15 mL    mupirocin (BACTROBAN) 2 % ointment 1 Application    heparin (porcine) 25,000 units/250 mL in dextrose 5 % infusion    heparin (porcine) injection 4,000 Units    heparin (porcine) injection 2,000 Units    pantoprazole (PROTONIX) EC tablet 40 mg    docusate sodium-sennosides (SENOKOT S) 50-8.6 MG 1 tablet    atorvastatin (LIPITOR) tablet 40 mg    sodium chloride 0.9 % flush bag 25 mL    sodium chloride 0.9 % injection 2 mL    acetaminophen (TYLENOL) tablet 650 mg    melatonin tablet 6 mg     polyethylene glycol (MIRALAX) packet 17 g    ondansetron (ZOFRAN) injection 4 mg    Potassium Standard Replacement Protocol (Levels 3.5 and lower)    Phosphorus Standard Replacement Protocol    Magnesium Standard Replacement Protocol       Current Facility-Administered Medications   Medication Dose Route Frequency Provider Last Rate Last Admin    sodium chloride 0.9% infusion   Intravenous Continuous Prabhaister, Yo A, CNP        furosemide (LASIX) 250 mg in NaCl 0.9% 125 mL infusion  10 mg/hr Intravenous Continuous Carrie Bartlett APNP 5 mL/hr at 07/10/24 1500 10 mg/hr at 07/10/24 1500    heparin (porcine) 25,000 units/250 mL in dextrose 5 % infusion  1-30 Units/kg/hr (Dosing Weight) Intravenous Continuous Yo Cooper CNP 9.2 mL/hr at 07/10/24 1500 11 Units/kg/hr at 07/10/24 1500       Review of Systems:  GENERAL: fatigue  EYE: negative  ENT: negative  CV: negative  RESPIRATORY: +SOB, + BAILEY  GI: negative  : negative  MSK: negative  SKIN: negative  NEURO: negative  PSYCH: negative  ENDOCRINE: negative  HEME/LYMPH: negative     Physical Exam  General: NAD, appears comfortable  Neuro: alert and oriented x3, no focal neuro deficits noted  HEENT: atraumatic, no icterus  Lungs: lungs diminished with fine crackles bilaterally, no respiratory distress  Cardio:  RRR. IABP in R Fem, dressing C/D/I  Abdomen: NT, ND, soft, + BS  Skin: dry, no rashes, dry dressing over right knee C/D/I  Extremities: warm, appears well perfused, RLE +2 edema, bilateral radial pulses +2, bilateral DP/PT +2    Vital Last Value (24 Hour) 24 Hour Range   Temp 98.2 °F (36.8 °C) (07/10/24 1200) Temp  Min: 98 °F (36.7 °C)  Max: 98.8 °F (37.1 °C)   HR 90 (07/10/24 1500) Pulse  Min: 88  Max: 114   BP (Non-Invasive) 110/50 (07/10/24 1500)  BP  Min: 110/50  Max: 139/38    BP (A-Line)   No data recorded   CVP   No data recorded   RR 16 (07/10/24 1500) Resp  Min: 13  Max: 24   SpO2 98 % (07/10/24 1500) SpO2  Min: 92 %  Max: 100 %   O2 Therapy O2  Device: Nasal cannula         I&O:    Intake/Output Summary (Last 24 hours) at 7/10/2024 1638  Last data filed at 7/10/2024 1500  Gross per 24 hour   Intake 436.95 ml   Output 1000 ml   Net -563.05 ml       Pertinent Labs/Imaging    Labs:  CBC:   Recent Labs     07/10/24  0019 07/10/24  0454   WBC 11.3* 12.0*   RBC 2.78* 2.92*   HGB 7.9* 8.3*   HCT 25.5* 27.3*    365     CMP:  Recent Labs     07/10/24  0019 07/10/24  0454   SODIUM 137 138   POTASSIUM 3.5 4.2   CHLORIDE 99 101   ANIONGAP 10 9   GLUCOSE 189* 180*   BUN 25* 25*   CREATININE 1.00* 0.90   ALBUMIN 2.8*  --    CALCIUM 9.8 9.9   AST 44*  --      Coagulation:   Recent Labs     07/10/24  0019 07/10/24  0454 07/10/24  1121   INR 1.1 1.0  --    PTT 39* 41* 81*       Imaging:  IMAGING DATA:  CT CHEST WO CONTRAST   Final Result by Ketan Herman MD (07/10 1522)      1.   Multivessel coronary artery calcification, as above.   2.   Aortic valvular mitral annular calcification.   3.   Findings of fluid overload including mild pulmonary edema and small   bilateral pleural effusions. Adjacent compressive atelectasis in the   bilateral lower lobes.   4.   IABP marker within the proximal descending thoracic aorta, inflated   throughout its course through the suprarenal abdominal aorta, partially   included.         Electronically Signed by: KETAN HERMAN MD    Signed on: 7/10/2024 3:22 PM    Workstation ID: [id number]       Carotid Duplex   Final Result by Betito Silva MD (07/10 4453)      No Doppler ultrasound evidence of hemodynamically significant internal   carotid artery stenosis bilaterally.       Bilateral vertebral arteries are patent with antegrade flow.               DIAGNOSTIC DOPPLER CRITERIA FOR CAROTID ARTERY STENOSIS:   <50% ICA stenosis:  PSV <125 cm/s (EDV > 40cm/s; SVR <2)   50-69% ICA stenosis: -229 cm/s (EDV > 40-99 cm/s; SVR 2-3.9)   > 70% ICA stenosis: PSV > 230 cm/s (EDV >100 cm/s; SVR >4)      Measurement of the carotid stenosis  is based on velocity and ratio criteria   that correlates to residual internal carotid artery diameter with the North   American Symptomatic Carotid Endarterectomy trial (NASCET) based stenosis   levels, which uses a distal internal carotid diameter as the denominator   for stenosis measurement.            Electronically Signed by: RAMIREZ AGUILAR MD    Signed on: 7/10/2024 2:05 PM    Workstation ID: [id number]      XR CHEST AP OR PA   Final Result by Jennifer Michael MD (07/10 5759)   Impression:   Improving edema pattern. Support devices as above.      Electronically Signed by: JENNIFER MICHAEL MD    Signed on: 7/10/2024 1:59 PM    Workstation ID: [id number]      XR CHEST AP OR PA   Final Result by Sixto Leung MD (07/10 0101)   AIBP marker at the proximal ascending thoracic aorta. Diffuse interstitial   and patchy consolidative opacities bilaterally.                  Electronically Signed by: SIXTO LEUNG MD    Signed on: 7/10/2024 1:01 AM    Workstation ID: [id number]          Assessment:  Fernanda Chamberlain is a 71 year old female with a past medical history of type II DM, HTN, HLD, JOSE E, and GERD who presented to an OSH on 7/8 with CP & SOB. Patient found to have MVD after LHC with subsequent IABP placement. Patient transferred to [facility]TU for CABG evaluation with cardiac surgery on 7/9.    Problems:  Neuropathy  NSTEMI  CAD  ICM  HFrEF  HTN  HLD  Acute Hypoxic Respiratory Failure  Pulmonary Edema  JOSE E  GERD  DM2    Plan:  Neuro/Psych  Neuropathy  Continue PTA gabapentin 300mg BID  Cardiovascular  NSTEMI  CAD  Trops peaked at 931 ng/L  [facility] 7/8 with multivessel disease & elevated LVEDP  IABP placed during [facility], continue at 1:1  Continue heparin gtt  Continue ASA 81mg   Planning for CABG tomorrow   ICM  HFrEF  TTE (7/8/24): EF 40%. Inferolateral, anterolateral, anterior hypokinesis. Normal RV size/function. AV sclerotic, no AS. Mild MR.  TTE (3/2/23): EF 55-60%. Normal RV size/function. AV sclerotic, no AS.    Diuresis with Lasix 40mg IV BID  Cardiology following, appreciate care & recs  HTN  On losartan outpatient, consider restarting if persistently hypertensive  Resumption of BB started at OSH per cardiology given newly depressed EF  Goal SBP<160, MAP<100 - PRN IV hydralazine 10mg Q6H  HLD  Continue PTA atorvastatin  Pulmonary  Acute Hypoxic Respiratory Failure  Pulmonary Edema  CXR on admission with bilateral opacities  CTA PE at OSH 7/8 - negative for PE, but with pulmonary vascular congestion, small bilateral pleural effusions, NESTOR patchy ground glass opacity concerning for infection  Titrate oxygen for SpO2>92%  Diuresis as above  JOSE E  Does not wear CPAP at home  Can follow up outpatient   GI  GERD  Continue PTA pantoprazole  Tolerating diet  Bowel regimen in place  Renal/Metabolic  No active issues  Cr at OSH 0.86  Strict I&O  Avoid nephrotoxins  Keep K>4, Mg>2  Heme/Onc  Normocytic Anemia  Hgb at OSH 8.0  No signs of bleeding   Continue to monitor daily CBC  VB12, folate, Iron panel pending  Elevated D-Dimer  Elevated at 7.1  Venous Dopplers 7/8 negative for bilateral DVT  Infectious Disease  Leukocytosis, POA & improving  Currently afebrile, no overt signs of infection   Continue to monitor off of antibiotics  Will trend fever curve & WBCs   Endocrine  DM2  HgbA1c pending  Holding home metformin  LDSSI ordered, adjust as needed  Integument/MSK/Miscellaneous  S/p right knee arthroplasty 6/25/24  No active management at this time     ICU checklist  Feeding: Cardiac Consistent Carb  Analgesia: PRN Tylenol  Sedation: NA  Thromboembolism PPX: Heparin gtt to be off 7/11 0630  Stress Ulcer PPX: PPI  Glucose control: LDSSI  Bowel Regimen: Miralax/Senna  Lines/drains/tubes: PIV, R Fem IABP 7/8  Deescalation of antibiotics: NA  Therapies: PT/OT/ST when appropriate  Dispostion: CVTU  Code Status: Full Resuscitation    I have spent 50 minutes of critical care time performing evaluation, examination, complex data  interpretation and creating a diagnostic and therapeutic plan for this critically ill patient. Dr. Villafuerte is my collaboration physician for this encounter.     Terrance Lorenzo NP  Critical Care   No

## 2024-08-12 NOTE — PATIENT PROFILE ADULT - NSTRANSFERBELONGINGSRESP_GEN_A_NUR
[Alert] : alert [No Acute Distress] : no acute distress [No Respiratory Distress] : no respiratory distress [Auscultation Breath Sounds / Voice Sounds] : lungs were clear to auscultation bilaterally [Heart Rate And Rhythm] : heart rate was normal and rhythm regular [Murmurs] : no murmurs [Abdomen Tenderness] : non-tender [Abdomen Soft] : soft [de-identified] : The patient is in a soft cast of the left leg yes

## 2024-09-24 ENCOUNTER — NON-APPOINTMENT (OUTPATIENT)
Age: 54
End: 2024-09-24

## 2024-09-24 RX ORDER — CEPHALEXIN 500 MG/1
500 CAPSULE ORAL
Qty: 10 | Refills: 0 | Status: ACTIVE | COMMUNITY
Start: 2024-09-24 | End: 1900-01-01

## 2024-10-01 ENCOUNTER — APPOINTMENT (OUTPATIENT)
Dept: UROGYNECOLOGY | Facility: CLINIC | Age: 54
End: 2024-10-01
Payer: COMMERCIAL

## 2024-10-01 LAB
BILIRUB UR QL STRIP: NEGATIVE
GLUCOSE UR-MCNC: NEGATIVE
HCG UR QL: 0.2 EU/DL
HGB UR QL STRIP.AUTO: NEGATIVE
KETONES UR-MCNC: NEGATIVE
LEUKOCYTE ESTERASE UR QL STRIP: NEGATIVE
NITRITE UR QL STRIP: NEGATIVE
PH UR STRIP: 6
PROT UR STRIP-MCNC: NEGATIVE
SP GR UR STRIP: 1.01

## 2024-10-01 PROCEDURE — 81003 URINALYSIS AUTO W/O SCOPE: CPT | Mod: QW

## 2024-10-01 PROCEDURE — 52287 CYSTOSCOPY CHEMODENERVATION: CPT

## 2024-10-15 ENCOUNTER — APPOINTMENT (OUTPATIENT)
Dept: UROGYNECOLOGY | Facility: CLINIC | Age: 54
End: 2024-10-15

## 2024-10-15 VITALS
BODY MASS INDEX: 41.16 KG/M2 | SYSTOLIC BLOOD PRESSURE: 124 MMHG | WEIGHT: 218 LBS | HEIGHT: 61 IN | HEART RATE: 73 BPM | DIASTOLIC BLOOD PRESSURE: 78 MMHG

## 2024-10-15 DIAGNOSIS — N32.81 OVERACTIVE BLADDER: ICD-10-CM

## 2024-10-15 DIAGNOSIS — N39.46 MIXED INCONTINENCE: ICD-10-CM

## 2024-10-15 DIAGNOSIS — R35.0 FREQUENCY OF MICTURITION: ICD-10-CM

## 2024-10-15 DIAGNOSIS — N89.8 OTHER SPECIFIED NONINFLAMMATORY DISORDERS OF VAGINA: ICD-10-CM

## 2024-10-15 DIAGNOSIS — N95.2 POSTMENOPAUSAL ATROPHIC VAGINITIS: ICD-10-CM

## 2024-10-15 LAB
BILIRUB UR QL STRIP: NEGATIVE
CLARITY UR: CLEAR
COLLECTION METHOD: NORMAL
GLUCOSE UR-MCNC: NEGATIVE
HCG UR QL: 0.2 EU/DL
HGB UR QL STRIP.AUTO: NEGATIVE
KETONES UR-MCNC: NEGATIVE
LEUKOCYTE ESTERASE UR QL STRIP: NEGATIVE
NITRITE UR QL STRIP: NEGATIVE
PH UR STRIP: 6
PROT UR STRIP-MCNC: NEGATIVE
SP GR UR STRIP: 1

## 2024-10-15 PROCEDURE — 99214 OFFICE O/P EST MOD 30 MIN: CPT | Mod: 25

## 2024-10-15 PROCEDURE — 99459 PELVIC EXAMINATION: CPT

## 2024-10-15 PROCEDURE — 51701 INSERT BLADDER CATHETER: CPT | Mod: 59

## 2024-10-15 PROCEDURE — 81003 URINALYSIS AUTO W/O SCOPE: CPT | Mod: QW

## 2024-10-16 LAB
APPEARANCE: CLEAR
BACTERIA: NEGATIVE /HPF
BILIRUBIN URINE: NEGATIVE
BLOOD URINE: NEGATIVE
CAST: 0 /LPF
COLOR: YELLOW
EPITHELIAL CELLS: 0 /HPF
GLUCOSE QUALITATIVE U: NEGATIVE MG/DL
KETONES URINE: NEGATIVE MG/DL
LEUKOCYTE ESTERASE URINE: NEGATIVE
MICROSCOPIC-UA: NORMAL
NITRITE URINE: NEGATIVE
PH URINE: 6
PROTEIN URINE: NEGATIVE MG/DL
RED BLOOD CELLS URINE: 0 /HPF
SPECIFIC GRAVITY URINE: 1.01
UROBILINOGEN URINE: 0.2 MG/DL
WHITE BLOOD CELLS URINE: 0 /HPF

## 2024-10-17 LAB
BACTERIA UR CULT: NORMAL
CANDIDA VAG CYTO: NOT DETECTED
G VAGINALIS+PREV SP MTYP VAG QL MICRO: NOT DETECTED
T VAGINALIS VAG QL WET PREP: NOT DETECTED

## 2024-10-31 ENCOUNTER — APPOINTMENT (OUTPATIENT)
Dept: OBGYN | Facility: CLINIC | Age: 54
End: 2024-10-31

## 2024-10-31 VITALS — DIASTOLIC BLOOD PRESSURE: 70 MMHG | HEIGHT: 61 IN | SYSTOLIC BLOOD PRESSURE: 110 MMHG

## 2024-10-31 PROCEDURE — 99213 OFFICE O/P EST LOW 20 MIN: CPT

## 2024-11-12 ENCOUNTER — APPOINTMENT (OUTPATIENT)
Dept: UROGYNECOLOGY | Facility: CLINIC | Age: 54
End: 2024-11-12
Payer: COMMERCIAL

## 2024-11-12 PROCEDURE — 99214 OFFICE O/P EST MOD 30 MIN: CPT

## 2024-11-18 ENCOUNTER — OUTPATIENT (OUTPATIENT)
Dept: OUTPATIENT SERVICES | Facility: HOSPITAL | Age: 54
LOS: 1 days | End: 2024-11-18
Payer: COMMERCIAL

## 2024-11-18 VITALS
HEART RATE: 56 BPM | WEIGHT: 189.6 LBS | TEMPERATURE: 98 F | RESPIRATION RATE: 16 BRPM | HEIGHT: 62 IN | OXYGEN SATURATION: 97 % | DIASTOLIC BLOOD PRESSURE: 70 MMHG | SYSTOLIC BLOOD PRESSURE: 150 MMHG

## 2024-11-18 DIAGNOSIS — Z01.818 ENCOUNTER FOR OTHER PREPROCEDURAL EXAMINATION: ICD-10-CM

## 2024-11-18 DIAGNOSIS — Z98.890 OTHER SPECIFIED POSTPROCEDURAL STATES: Chronic | ICD-10-CM

## 2024-11-18 DIAGNOSIS — Z90.711 ACQUIRED ABSENCE OF UTERUS WITH REMAINING CERVICAL STUMP: Chronic | ICD-10-CM

## 2024-11-18 LAB
ABO RH CONFIRMATION: SIGNIFICANT CHANGE UP
ANION GAP SERPL CALC-SCNC: 1 MMOL/L — LOW (ref 5–17)
APPEARANCE UR: CLEAR — SIGNIFICANT CHANGE UP
APTT BLD: 33.3 SEC — SIGNIFICANT CHANGE UP (ref 24.5–35.6)
BASOPHILS # BLD AUTO: 0.02 K/UL — SIGNIFICANT CHANGE UP (ref 0–0.2)
BASOPHILS NFR BLD AUTO: 0.5 % — SIGNIFICANT CHANGE UP (ref 0–2)
BILIRUB UR-MCNC: NEGATIVE — SIGNIFICANT CHANGE UP
BLD GP AB SCN SERPL QL: SIGNIFICANT CHANGE UP
BUN SERPL-MCNC: 14 MG/DL — SIGNIFICANT CHANGE UP (ref 7–23)
CALCIUM SERPL-MCNC: 9.9 MG/DL — SIGNIFICANT CHANGE UP (ref 8.5–10.1)
CHLORIDE SERPL-SCNC: 108 MMOL/L — SIGNIFICANT CHANGE UP (ref 96–108)
CO2 SERPL-SCNC: 28 MMOL/L — SIGNIFICANT CHANGE UP (ref 22–31)
COLOR SPEC: YELLOW — SIGNIFICANT CHANGE UP
CREAT SERPL-MCNC: 0.98 MG/DL — SIGNIFICANT CHANGE UP (ref 0.5–1.3)
DIFF PNL FLD: NEGATIVE — SIGNIFICANT CHANGE UP
EGFR: 69 ML/MIN/1.73M2 — SIGNIFICANT CHANGE UP
EOSINOPHIL # BLD AUTO: 0.12 K/UL — SIGNIFICANT CHANGE UP (ref 0–0.5)
EOSINOPHIL NFR BLD AUTO: 2.8 % — SIGNIFICANT CHANGE UP (ref 0–6)
GLUCOSE SERPL-MCNC: 97 MG/DL — SIGNIFICANT CHANGE UP (ref 70–99)
GLUCOSE UR QL: NEGATIVE MG/DL — SIGNIFICANT CHANGE UP
HCT VFR BLD CALC: 38.9 % — SIGNIFICANT CHANGE UP (ref 34.5–45)
HGB BLD-MCNC: 12.7 G/DL — SIGNIFICANT CHANGE UP (ref 11.5–15.5)
IMM GRANULOCYTES NFR BLD AUTO: 0.2 % — SIGNIFICANT CHANGE UP (ref 0–0.9)
INR BLD: 0.95 RATIO — SIGNIFICANT CHANGE UP (ref 0.85–1.16)
KETONES UR-MCNC: NEGATIVE MG/DL — SIGNIFICANT CHANGE UP
LEUKOCYTE ESTERASE UR-ACNC: NEGATIVE — SIGNIFICANT CHANGE UP
LYMPHOCYTES # BLD AUTO: 1.13 K/UL — SIGNIFICANT CHANGE UP (ref 1–3.3)
LYMPHOCYTES # BLD AUTO: 26.1 % — SIGNIFICANT CHANGE UP (ref 13–44)
MCHC RBC-ENTMCNC: 27.7 PG — SIGNIFICANT CHANGE UP (ref 27–34)
MCHC RBC-ENTMCNC: 32.6 G/DL — SIGNIFICANT CHANGE UP (ref 32–36)
MCV RBC AUTO: 84.7 FL — SIGNIFICANT CHANGE UP (ref 80–100)
MONOCYTES # BLD AUTO: 0.43 K/UL — SIGNIFICANT CHANGE UP (ref 0–0.9)
MONOCYTES NFR BLD AUTO: 9.9 % — SIGNIFICANT CHANGE UP (ref 2–14)
NEUTROPHILS # BLD AUTO: 2.62 K/UL — SIGNIFICANT CHANGE UP (ref 1.8–7.4)
NEUTROPHILS NFR BLD AUTO: 60.5 % — SIGNIFICANT CHANGE UP (ref 43–77)
NITRITE UR-MCNC: NEGATIVE — SIGNIFICANT CHANGE UP
PH UR: 6 — SIGNIFICANT CHANGE UP (ref 5–8)
PLATELET # BLD AUTO: 279 K/UL — SIGNIFICANT CHANGE UP (ref 150–400)
POTASSIUM SERPL-MCNC: 4.1 MMOL/L — SIGNIFICANT CHANGE UP (ref 3.5–5.3)
POTASSIUM SERPL-SCNC: 4.1 MMOL/L — SIGNIFICANT CHANGE UP (ref 3.5–5.3)
PROT UR-MCNC: NEGATIVE MG/DL — SIGNIFICANT CHANGE UP
PROTHROM AB SERPL-ACNC: 11.2 SEC — SIGNIFICANT CHANGE UP (ref 9.9–13.4)
RBC # BLD: 4.59 M/UL — SIGNIFICANT CHANGE UP (ref 3.8–5.2)
RBC # FLD: 13.3 % — SIGNIFICANT CHANGE UP (ref 10.3–14.5)
SODIUM SERPL-SCNC: 137 MMOL/L — SIGNIFICANT CHANGE UP (ref 135–145)
SP GR SPEC: 1.01 — SIGNIFICANT CHANGE UP (ref 1–1.03)
UROBILINOGEN FLD QL: 0.2 MG/DL — SIGNIFICANT CHANGE UP (ref 0.2–1)
WBC # BLD: 4.33 K/UL — SIGNIFICANT CHANGE UP (ref 3.8–10.5)
WBC # FLD AUTO: 4.33 K/UL — SIGNIFICANT CHANGE UP (ref 3.8–10.5)

## 2024-11-18 PROCEDURE — 87077 CULTURE AEROBIC IDENTIFY: CPT

## 2024-11-18 PROCEDURE — 36415 COLL VENOUS BLD VENIPUNCTURE: CPT

## 2024-11-18 PROCEDURE — 99214 OFFICE O/P EST MOD 30 MIN: CPT | Mod: 25

## 2024-11-18 PROCEDURE — 85730 THROMBOPLASTIN TIME PARTIAL: CPT

## 2024-11-18 PROCEDURE — 85025 COMPLETE CBC W/AUTO DIFF WBC: CPT

## 2024-11-18 PROCEDURE — 80048 BASIC METABOLIC PNL TOTAL CA: CPT

## 2024-11-18 PROCEDURE — 87086 URINE CULTURE/COLONY COUNT: CPT

## 2024-11-18 PROCEDURE — 83036 HEMOGLOBIN GLYCOSYLATED A1C: CPT

## 2024-11-18 PROCEDURE — 87186 SC STD MICRODIL/AGAR DIL: CPT

## 2024-11-18 PROCEDURE — 86901 BLOOD TYPING SEROLOGIC RH(D): CPT

## 2024-11-18 PROCEDURE — 86850 RBC ANTIBODY SCREEN: CPT

## 2024-11-18 PROCEDURE — 85610 PROTHROMBIN TIME: CPT

## 2024-11-18 PROCEDURE — 81003 URINALYSIS AUTO W/O SCOPE: CPT

## 2024-11-18 PROCEDURE — 86900 BLOOD TYPING SEROLOGIC ABO: CPT

## 2024-11-18 RX ORDER — NALTREXONE HYDROCHLORIDE 50 MG/1
1 TABLET, FILM COATED ORAL
Refills: 0 | DISCHARGE

## 2024-11-18 RX ORDER — ATOMOXETINE 18 MG/1
1 CAPSULE ORAL
Refills: 0 | DISCHARGE

## 2024-11-18 RX ORDER — GABAPENTIN 300 MG/1
1 CAPSULE ORAL
Refills: 0 | DISCHARGE

## 2024-11-18 RX ORDER — ACAMPROSATE CALCIUM 333 MG/1
2 TABLET, DELAYED RELEASE ORAL
Refills: 0 | DISCHARGE

## 2024-11-18 RX ORDER — TRAZODONE HYDROCHLORIDE 150 MG/1
0 TABLET ORAL
Refills: 0 | DISCHARGE

## 2024-11-18 RX ORDER — MIRTAZAPINE 15 MG/1
1 TABLET, FILM COATED ORAL
Refills: 0 | DISCHARGE

## 2024-11-18 RX ORDER — SERTRALINE HYDROCHLORIDE 100 MG/1
1 TABLET, FILM COATED ORAL
Refills: 0 | DISCHARGE

## 2024-11-18 RX ORDER — RISPERIDONE 4 MG/1
25 TABLET ORAL
Refills: 0 | DISCHARGE

## 2024-11-18 RX ORDER — HYDROXYZINE HYDROCHLORIDE 25 MG/1
1 TABLET, FILM COATED ORAL
Refills: 0 | DISCHARGE

## 2024-11-18 RX ORDER — RIMEGEPANT SULFATE 75 MG/75MG
1 TABLET, ORALLY DISINTEGRATING ORAL
Refills: 0 | DISCHARGE

## 2024-11-18 NOTE — H&P PST ADULT - NSICDXFAMILYHX_GEN_ALL_CORE_FT
FAMILY HISTORY:  FH: HTN (hypertension), mother and father  FH: hyperlipidemia, mother and father    Father  Still living? No  FH: lung cancer, Age at diagnosis: Age Unknown    Sibling  Still living? Yes, Estimated age: Age Unknown  FH: uterine cancer, Age at diagnosis: Age Unknown

## 2024-11-18 NOTE — H&P PST ADULT - NSICDXPASTMEDICALHX_GEN_ALL_CORE_FT
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

## 2024-11-18 NOTE — H&P PST ADULT - ASSESSMENT
54 y.o female scheduled for Posterior Repair, Mid urethral sling, and Cystoscopy   Plan  1. Stop all NSAIDS, herbal supplements and vitamins for 7 days. ASA per neurology directions. Zepbound- instructed to HOLD one week prior to surgery   2. NPO at --follow ASU directions   3. Take the following medications --Acamprosate, Atomoxetine, Protonix, Lamotrigine, Naltrexone, Hydroxyzine, Sertraline  with small sips of water on the morning of your procedure/surgery.  4. Labs, EKG as per surgeon  5. PMD LEANDRO Farr cardiology and DONNY Wick neurology visit for optimization prior to surgery as per surgeon  6. Preprocedure education provided

## 2024-11-18 NOTE — H&P PST ADULT - NSICDXPASTSURGICALHX_GEN_ALL_CORE_FT
PAST SURGICAL HISTORY:  H/O colonoscopy     History of esophagogastroduodenoscopy (EGD)     History of partial hysterectomy 8/2010

## 2024-11-18 NOTE — H&P PST ADULT - HISTORY OF PRESENT ILLNESS
54 y.o WD, WN female presents to PST with hx of stress incontinence. Patient reports stress incontinence for years; She has tried conservative management, pelvic floor exercises and Botox injections without relief. Hx significant for HTN, Hyperlipidemia, Sleep Apnea, hx cocaine --recovery since 2017  and ETOH abuse--recovery since 5/28/24, Pseudoseizures, Bipolar, Anxiety /Depression, Multiple CVA's --no residual and GERD. Patient followed with gyn, discussed and scheduled for a Posterior Repair, Mid urethral sling, and Cystoscopy

## 2024-11-18 NOTE — H&P PST ADULT - LAST STRESS TEST
2024 Medical Necessity Clause: This procedure was medically necessary because the lesions that were treated were: Post-Care Instructions: I reviewed with the patient in detail post-care instructions. Patient is to wear sunprotection, and avoid picking at any of the treated lesions. Pt may apply Vaseline to crusted or scabbing areas. Anesthesia Volume In Cc: 0 Detail Level: Zone Medical Necessity Information: It is in your best interest to select a reason for this procedure from the list below. All of these items fulfill various CMS LCD requirements except the new and changing color options. Render Post-Care Instructions In Note?: no Total Number Of Lesions Treated: 8 Consent: The patient's consent was obtained including but not limited to risks of crusting, scabbing, blistering, scarring, darker or lighter pigmentary change, recurrence, incomplete removal and infection. Total Number Of Lesions Treated: 1

## 2024-11-18 NOTE — H&P PST ADULT - NEUROLOGICAL COMMENTS
hx Multiple strokes---presents as loss of speech, blurred vision, patient states last week last episode, no residual deficits --managed by neurology. DONNY Wick, has seen for optimization for surgery

## 2024-11-19 DIAGNOSIS — Z01.818 ENCOUNTER FOR OTHER PREPROCEDURAL EXAMINATION: ICD-10-CM

## 2024-11-19 LAB
A1C WITH ESTIMATED AVERAGE GLUCOSE RESULT: 5.4 % — SIGNIFICANT CHANGE UP (ref 4–5.6)
ESTIMATED AVERAGE GLUCOSE: 108 MG/DL — SIGNIFICANT CHANGE UP (ref 68–114)

## 2024-11-20 ENCOUNTER — EMERGENCY (EMERGENCY)
Facility: HOSPITAL | Age: 54
LOS: 1 days | Discharge: HOME | End: 2024-11-20
Attending: STUDENT IN AN ORGANIZED HEALTH CARE EDUCATION/TRAINING PROGRAM
Payer: COMMERCIAL

## 2024-11-20 VITALS
TEMPERATURE: 98 F | RESPIRATION RATE: 16 BRPM | HEIGHT: 62 IN | OXYGEN SATURATION: 96 % | SYSTOLIC BLOOD PRESSURE: 124 MMHG | HEART RATE: 55 BPM | DIASTOLIC BLOOD PRESSURE: 58 MMHG

## 2024-11-20 VITALS
OXYGEN SATURATION: 99 % | DIASTOLIC BLOOD PRESSURE: 77 MMHG | SYSTOLIC BLOOD PRESSURE: 139 MMHG | TEMPERATURE: 98 F | RESPIRATION RATE: 16 BRPM | HEART RATE: 50 BPM

## 2024-11-20 DIAGNOSIS — Z98.890 OTHER SPECIFIED POSTPROCEDURAL STATES: Chronic | ICD-10-CM

## 2024-11-20 DIAGNOSIS — Z90.711 ACQUIRED ABSENCE OF UTERUS WITH REMAINING CERVICAL STUMP: Chronic | ICD-10-CM

## 2024-11-20 PROBLEM — Z86.73 PERSONAL HISTORY OF TRANSIENT ISCHEMIC ATTACK (TIA), AND CEREBRAL INFARCTION WITHOUT RESIDUAL DEFICITS: Chronic | Status: ACTIVE | Noted: 2024-11-18

## 2024-11-20 LAB
-  AMPICILLIN: SIGNIFICANT CHANGE UP
-  CIPROFLOXACIN: SIGNIFICANT CHANGE UP
-  LEVOFLOXACIN: SIGNIFICANT CHANGE UP
-  NITROFURANTOIN: SIGNIFICANT CHANGE UP
-  TETRACYCLINE: SIGNIFICANT CHANGE UP
-  VANCOMYCIN: SIGNIFICANT CHANGE UP
ALBUMIN SERPL ELPH-MCNC: 3.7 G/DL — SIGNIFICANT CHANGE UP (ref 3.3–5.2)
ALP SERPL-CCNC: 81 U/L — SIGNIFICANT CHANGE UP (ref 40–120)
ALT FLD-CCNC: 21 U/L — SIGNIFICANT CHANGE UP
ANION GAP SERPL CALC-SCNC: 12 MMOL/L — SIGNIFICANT CHANGE UP (ref 5–17)
APTT BLD: 31.2 SEC — SIGNIFICANT CHANGE UP (ref 24.5–35.6)
AST SERPL-CCNC: 25 U/L — SIGNIFICANT CHANGE UP
BASOPHILS # BLD AUTO: 0.04 K/UL — SIGNIFICANT CHANGE UP (ref 0–0.2)
BASOPHILS NFR BLD AUTO: 0.9 % — SIGNIFICANT CHANGE UP (ref 0–2)
BILIRUB SERPL-MCNC: <0.2 MG/DL — LOW (ref 0.4–2)
BUN SERPL-MCNC: 12.7 MG/DL — SIGNIFICANT CHANGE UP (ref 8–20)
CALCIUM SERPL-MCNC: 8.6 MG/DL — SIGNIFICANT CHANGE UP (ref 8.4–10.5)
CHLORIDE SERPL-SCNC: 100 MMOL/L — SIGNIFICANT CHANGE UP (ref 96–108)
CO2 SERPL-SCNC: 25 MMOL/L — SIGNIFICANT CHANGE UP (ref 22–29)
CREAT SERPL-MCNC: 0.75 MG/DL — SIGNIFICANT CHANGE UP (ref 0.5–1.3)
CULTURE RESULTS: ABNORMAL
EGFR: 95 ML/MIN/1.73M2 — SIGNIFICANT CHANGE UP
EOSINOPHIL # BLD AUTO: 0.14 K/UL — SIGNIFICANT CHANGE UP (ref 0–0.5)
EOSINOPHIL NFR BLD AUTO: 3 % — SIGNIFICANT CHANGE UP (ref 0–6)
ETHANOL SERPL-MCNC: <10 MG/DL — SIGNIFICANT CHANGE UP (ref 0–9)
GLUCOSE SERPL-MCNC: 92 MG/DL — SIGNIFICANT CHANGE UP (ref 70–99)
HCT VFR BLD CALC: 34 % — LOW (ref 34.5–45)
HGB BLD-MCNC: 11.2 G/DL — LOW (ref 11.5–15.5)
IMM GRANULOCYTES NFR BLD AUTO: 0.2 % — SIGNIFICANT CHANGE UP (ref 0–0.9)
INR BLD: 0.95 RATIO — SIGNIFICANT CHANGE UP (ref 0.85–1.16)
LYMPHOCYTES # BLD AUTO: 1.79 K/UL — SIGNIFICANT CHANGE UP (ref 1–3.3)
LYMPHOCYTES # BLD AUTO: 38.6 % — SIGNIFICANT CHANGE UP (ref 13–44)
MCHC RBC-ENTMCNC: 27.3 PG — SIGNIFICANT CHANGE UP (ref 27–34)
MCHC RBC-ENTMCNC: 32.9 G/DL — SIGNIFICANT CHANGE UP (ref 32–36)
MCV RBC AUTO: 82.9 FL — SIGNIFICANT CHANGE UP (ref 80–100)
METHOD TYPE: SIGNIFICANT CHANGE UP
MONOCYTES # BLD AUTO: 0.57 K/UL — SIGNIFICANT CHANGE UP (ref 0–0.9)
MONOCYTES NFR BLD AUTO: 12.3 % — SIGNIFICANT CHANGE UP (ref 2–14)
NEUTROPHILS # BLD AUTO: 2.09 K/UL — SIGNIFICANT CHANGE UP (ref 1.8–7.4)
NEUTROPHILS NFR BLD AUTO: 45 % — SIGNIFICANT CHANGE UP (ref 43–77)
ORGANISM # SPEC MICROSCOPIC CNT: ABNORMAL
ORGANISM # SPEC MICROSCOPIC CNT: SIGNIFICANT CHANGE UP
PLATELET # BLD AUTO: 267 K/UL — SIGNIFICANT CHANGE UP (ref 150–400)
POTASSIUM SERPL-MCNC: 3.7 MMOL/L — SIGNIFICANT CHANGE UP (ref 3.5–5.3)
POTASSIUM SERPL-SCNC: 3.7 MMOL/L — SIGNIFICANT CHANGE UP (ref 3.5–5.3)
PROT SERPL-MCNC: 6.1 G/DL — LOW (ref 6.6–8.7)
PROTHROM AB SERPL-ACNC: 10.7 SEC — SIGNIFICANT CHANGE UP (ref 9.9–13.4)
RBC # BLD: 4.1 M/UL — SIGNIFICANT CHANGE UP (ref 3.8–5.2)
RBC # FLD: 13.3 % — SIGNIFICANT CHANGE UP (ref 10.3–14.5)
SODIUM SERPL-SCNC: 137 MMOL/L — SIGNIFICANT CHANGE UP (ref 135–145)
SPECIMEN SOURCE: SIGNIFICANT CHANGE UP
TROPONIN T, HIGH SENSITIVITY RESULT: <6 NG/L — SIGNIFICANT CHANGE UP (ref 0–51)
WBC # BLD: 4.64 K/UL — SIGNIFICANT CHANGE UP (ref 3.8–10.5)
WBC # FLD AUTO: 4.64 K/UL — SIGNIFICANT CHANGE UP (ref 3.8–10.5)

## 2024-11-20 PROCEDURE — 70498 CT ANGIOGRAPHY NECK: CPT | Mod: 26,MC

## 2024-11-20 PROCEDURE — 99285 EMERGENCY DEPT VISIT HI MDM: CPT | Mod: 25

## 2024-11-20 PROCEDURE — 80061 LIPID PANEL: CPT

## 2024-11-20 PROCEDURE — 85025 COMPLETE CBC W/AUTO DIFF WBC: CPT

## 2024-11-20 PROCEDURE — G0378: CPT

## 2024-11-20 PROCEDURE — 70496 CT ANGIOGRAPHY HEAD: CPT | Mod: MC

## 2024-11-20 PROCEDURE — 0042T: CPT | Mod: MC

## 2024-11-20 PROCEDURE — 85610 PROTHROMBIN TIME: CPT

## 2024-11-20 PROCEDURE — 82746 ASSAY OF FOLIC ACID SERUM: CPT

## 2024-11-20 PROCEDURE — 85730 THROMBOPLASTIN TIME PARTIAL: CPT

## 2024-11-20 PROCEDURE — 82962 GLUCOSE BLOOD TEST: CPT

## 2024-11-20 PROCEDURE — 82607 VITAMIN B-12: CPT

## 2024-11-20 PROCEDURE — 80307 DRUG TEST PRSMV CHEM ANLYZR: CPT

## 2024-11-20 PROCEDURE — 80053 COMPREHEN METABOLIC PANEL: CPT

## 2024-11-20 PROCEDURE — 70496 CT ANGIOGRAPHY HEAD: CPT | Mod: 26,MC

## 2024-11-20 PROCEDURE — 70551 MRI BRAIN STEM W/O DYE: CPT | Mod: 26,MC

## 2024-11-20 PROCEDURE — 93005 ELECTROCARDIOGRAM TRACING: CPT

## 2024-11-20 PROCEDURE — 93010 ELECTROCARDIOGRAM REPORT: CPT

## 2024-11-20 PROCEDURE — 70498 CT ANGIOGRAPHY NECK: CPT | Mod: MC

## 2024-11-20 PROCEDURE — 70551 MRI BRAIN STEM W/O DYE: CPT | Mod: MC

## 2024-11-20 PROCEDURE — 36415 COLL VENOUS BLD VENIPUNCTURE: CPT

## 2024-11-20 PROCEDURE — 84484 ASSAY OF TROPONIN QUANT: CPT

## 2024-11-20 PROCEDURE — 99223 1ST HOSP IP/OBS HIGH 75: CPT

## 2024-11-20 NOTE — ED CDU PROVIDER DISPOSITION NOTE - PATIENT PORTAL LINK FT
You can access the FollowMyHealth Patient Portal offered by [facility] by registering at the following website: [email or web address]. By joining [employer]’s FollowMyHealth portal, you will also be able to view your health information using other applications (apps) compatible with our system.

## 2024-11-20 NOTE — ED PROVIDER NOTE - CLINICAL SUMMARY MEDICAL DECISION MAKING FREE TEXT BOX
53y Female pmh asthma, pseudoseizures, htn, bipolar, strokes here brought by [facility] for right sided deficits. patient reports woke up at 6a then again at 730a feeling her normal. Went back to sleep and around 840a woke up with slurred speech and right sided weakness. With the [facility]EMS, she had right arm drift but is able to perform finger to nose without any drift or dysmetria. Per [facility] note had inconsistent, nonfocal, nondisabling exam so TNK was deferred. Had CTH and CT angio head performed without acute findings prior to arrival here. Here patient reports speech deficit is resolved, complaining of right arm heaviness and weakness and loss of sensation below right knee. Denies fever, chills, headache, n/v, cp, sob, abd pain.  AP - similar presentation in 2023 with negative MRI, discussed with stroke neuro (Dr. Portillo), agrees not TNK candidate at this time given non disabling symptoms. recommend MR brain and reassess

## 2024-11-20 NOTE — ED CDU PROVIDER DISPOSITION NOTE - CARE PROVIDER_API CALL
Magan Albarado  Neurology  [address], [address]  [place] [address]  Phone: [phone]  Fax: [phone]  Follow Up Time: Urgent

## 2024-11-20 NOTE — ED CDU PROVIDER DISPOSITION NOTE - NSFOLLOWUPINSTRUCTIONS_ED_ALL_ED_FT
please follow with primary medical clinician within the next 3-5 days   please follow with outpatient neurologist  continue all daily medications   new or worsening condition seek medical attention immediately

## 2024-11-20 NOTE — ED PROVIDER NOTE - PROGRESS NOTE DETAILS
Given the significant and immediate threats to this patient based on initial presentation, the benefits of emergency contrast-enhanced CT imaging without knowing how much contrast patient received with [facility] for CT head angio outweigh the potential risk of harm due to contrast-induced nephropathy. patient reassessed, able to hold right leg up now, speech remains clear. only complaining of right arm "heaviness" at this time. pending formal CT reads

## 2024-11-20 NOTE — ED PROVIDER NOTE - OBJECTIVE STATEMENT
53y Female pmh asthma, pseudoseizures, htn, bipolar, strokes here brought by [facility] for right sided deficits. patient reports woke up at 6a then again at 730a feeling her normal. Went back to sleep and around 840a woke up with slurred speech and right sided weakness. With the [facility]/EMS, she had right arm drift but is able to perform finger to nose without any drift or dysmetria. Per [facility] note had inconsistent, nonfocal, nondisabling exam so TNK was deferred. Had CTH and CT angio head performed without acute findings prior to arrival here. Here patient reports speech deficit is resolved, complaining of right arm heaviness and weakness and loss of sensation below right knee. Denies fever, chills, headache, n/v, cp, sob, abd pain. 53y Female pmh asthma, pseudoseizures, hld, bipolar, strokes here brought by [facility] for right sided deficits. patient reports woke up at 6a then again at 730a feeling her normal. Went back to sleep and around 840a woke up with slurred speech and right sided weakness. With the [facility]SU/EMS, she had right arm drift but is able to perform finger to nose without any drift or dysmetria. Per [facility] note had inconsistent, nonfocal, nondisabling exam so TNK was deferred. Had CTH and CT angio head performed without acute findings prior to arrival here. Here patient reports speech deficit is resolved, complaining of right arm heaviness and weakness and loss of sensation below right knee. Denies fever, chills, headache, n/v, cp, sob, abd pain.

## 2024-11-20 NOTE — ED ADULT NURSE NOTE - NSFALLRISKINTERV_ED_ALL_ED
Communicate fall risk and risk factors to all staff, patient, and family/Provide visual cue: yellow wristband, yellow gown, etc/Reinforce activity limits and safety measures with patient and family/Call bell, personal items and telephone in reach/Instruct patient to call for assistance before getting out of bed/chair/stretcher/Non-slip footwear applied when patient is off stretcher/[place] to call system/Physically safe environment - no spills, clutter or unnecessary equipment/Purposeful Proactive Rounding/Room/bathroom lighting operational, light cord in reach

## 2024-11-20 NOTE — ED PROVIDER NOTE - PHYSICAL EXAMINATION
Vital Signs per nursing documentation  Gen: well appearing, no acute distress  HEENT: NCAT, MMM  Cardiac: regular rate rhythm, normal S1S2  Chest: clear to auscultation bilateral, no wheezes or crackles  Abdomen: soft, non tender non distended  Extremity: no gross deformity, good perfusion  Skin: no rash  Neuro: aox3, speech clear, FTN normal bilateral, right leg drifts to bed <5 secs

## 2024-11-20 NOTE — ED CDU PROVIDER INITIAL DAY NOTE - NSICDXPASTMEDICALHX_GEN_ALL_CORE_FT
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     
4 = No assist / stand by assistance

## 2024-11-20 NOTE — ED ADULT NURSE REASSESSMENT NOTE - NS ED NURSE REASSESS COMMENT FT1
Assumed care of patient from previous RN at this time. Patient presented to ED for right sided weakness and decreased sensation. Patient brought in by mobile stroke unit. Patient pending MRI at this time. Aware of POC.

## 2024-11-20 NOTE — ED ADULT NURSE NOTE - OBJECTIVE STATEMENT
BIBMSU. CT head and CTA performed PTA and negative. Pt stated she woke up at 730 and felt normal, went back to bed, woke up at 830, feeling her right arm was heavy, decreased sensation to right side, and unable to find her words. Upon arrival pt able to speak with no dysarthria or aphasia, slight drift noted to right arm and right leg, facial droop noted to right side, and sensation absent to right calf, diminished to right arm and face. Pt AOx4, speaking coherently, NIH 4 for facial droop, right arm drift, left leg droop, ans sensory, respirations even and unlabored on RA, skin warm and dry.

## 2024-11-20 NOTE — ED CDU PROVIDER INITIAL DAY NOTE - PROGRESS NOTE DETAILS
presurgical testing showing >100K enterococcus in urine. reports chronic dysuria. no reported abd pain hematuria or fevers. reports allergy to cipro is rash. pt asymptomatic at this time.

## 2024-11-20 NOTE — ED ADULT TRIAGE NOTE - CHIEF COMPLAINT QUOTE
BIBA from home c/o slurred speech starting this morning. As per pt "I woke up at 0730 feeling fine then I went back to sleep and woke up at 0830 feeling weak". As per [facility]  on arrival pt had right arm weakness, and right arm drift. Pt slurring words. Facial droop noted on arrival. MD Rowan called to triage. Code stroke activated.

## 2024-11-20 NOTE — ED CDU PROVIDER DISPOSITION NOTE - CLINICAL COURSE
54-year-old female history of hypertension hyperlipidemia seizures anxiety depression presenting to the ED from mobile stroke unit for episode of resolving dysarthria and right-sided weakness that began this morning around 830 placed in observation for MRI of which no infarct noted incidentally found to have bihemispheric white matter signaling out of proportion to patient's age a nonspecific finding.  Patient denies fever, and also reports that she had a MS workup outpatient that was negative.  Patient reports history of chronic migraines, no current headache.  Reports that she is symptomatically feels well no weakness no difficulty with speech appreciated and ambulatory within the emergency department.  Patient advised of results and need for follow-up with both primary medical clinician and outpatient neurology, return precautions advised.

## 2024-11-20 NOTE — ED CDU PROVIDER INITIAL DAY NOTE - ATTENDING APP SHARED VISIT CONTRIBUTION OF CARE
I, Damian Bee MD, have seen and examined the patient on the date of service.  I agree with the SHAYY's assessment as written, with exceptions or additions as noted below or in a separate note.  Patient with a past medical history of pseudoseizures, hypertension, hyperlipidemia, anxiety and depression, prior stroke with residual right-sided weakness is presenting with concern for right-sided weakness and dysarthria.  Patient placed in observation status for MRI.  States that around 6:00 this morning she was found to have some trouble speaking that was more persistent at 830.  At time of ED arrival, states that her symptoms have been improving.  Reports chronic right-sided weakness which is currently baseline though earlier in the day her arm was feeling heavy.  On exam here she does have a right-sided pronator drift but otherwise speaking clearly.  CT imaging negative for acute stroke, placed in observation status for MRI.  Patient currently feels that she is back to her neurologic baseline at this time.

## 2024-11-20 NOTE — ED CDU PROVIDER INITIAL DAY NOTE - CLINICAL SUMMARY MEDICAL DECISION MAKING FREE TEXT BOX
53 yo female hx of HTN HLD seizures, anxiety, depression, 1 month on zepbound for weight-loss presenting to th ED as code stroke from mobile stroke unit for right sided weakness and dysarthria that started around 830 this morning. symptoms improved. imaging from mobile stroke unit reviewed TNK deferred. placed in observation for MR head

## 2024-11-20 NOTE — ED ADULT TRIAGE NOTE - BEFAST SPEECH PHRASE
Patient has atypical pneumonia versus aspiration pneumonia  CT Chest reviewed  Procalcitonin level is elevated  Ceftriaxone & Azithromycin IV for now    Chest Physiotherapy & Pulmonary toilet  O2 NC to keep O2 sat > 92%  Xoponex nebs, as needed Yes

## 2024-11-20 NOTE — ED CDU PROVIDER DISPOSITION NOTE - ATTENDING APP SHARED VISIT CONTRIBUTION OF CARE
54-year-old female history of hypertension hyperlipidemia seizures anxiety depression presenting to the ED from mobile stroke unit for episode of resolving dysarthria and right-sided weakness that began this morning around 830 placed in observation for MRI of which no infarct noted incidentally found to have bihemispheric white matter signaling out of proportion to patient's age a nonspecific finding.  Patient denies fever, and also reports that she had a MS workup outpatient that was negative.  Patient reports history of chronic migraines, no current headache.  Reports that she is symptomatically feels well no weakness no difficulty with speech appreciated and ambulatory within the emergency department.  Patient advised of results and need for follow-up with both primary medical clinician and outpatient neurology, return precautions advised.    I, Dash Nichole, performed the initial face to face bedside interview with this patient regarding history of present illness, review of symptoms and relevant past medical, social and family history.  I completed an independent physical examination.  I was the initial provider who evaluated this patient. I have signed out the follow up of any pending tests (i.e. labs, radiological studies) to the ACP.  I have communicated the patient’s plan of care and disposition with the ACP.

## 2024-11-20 NOTE — ED ADULT NURSE NOTE - CHIEF COMPLAINT QUOTE
BIBA from home c/o slurred speech starting this morning. As per pt "I woke up at 0730 feeling fine then I went back to sleep and woke up at 0830 feeling weak". As per [facility]  on arrival pt had right arm weakness, and right arm drift. Pt slurring words. Facial droop noted on arrival. MD Rowan called to triage. Code stroke activated.

## 2024-11-20 NOTE — ED ADULT NURSE REASSESSMENT NOTE - NS ED NURSE REASSESS COMMENT FT1
Report given to Cornelia Wren RN and transported to B10. Pt awake and alert, AOx4, speaking coherently, respirations even and unlabored on RA, skin warm and dry. NIH 6, scored in stroke flow sheet

## 2024-11-21 PROBLEM — G43.909 MIGRAINE, UNSPECIFIED, NOT INTRACTABLE, WITHOUT STATUS MIGRAINOSUS: Chronic | Status: ACTIVE | Noted: 2024-11-18

## 2024-11-21 RX ORDER — AMPICILLIN 500 MG/1
500 CAPSULE ORAL
Qty: 28 | Refills: 0 | Status: ACTIVE | COMMUNITY
Start: 2024-11-21 | End: 1900-01-01

## 2024-11-22 PROBLEM — K31.84 GASTROPARESIS: Chronic | Status: ACTIVE | Noted: 2024-11-18

## 2024-11-22 PROBLEM — K21.9 GASTRO-ESOPHAGEAL REFLUX DISEASE WITHOUT ESOPHAGITIS: Chronic | Status: ACTIVE | Noted: 2024-11-18

## 2024-11-22 PROBLEM — Z87.898 PERSONAL HISTORY OF OTHER SPECIFIED CONDITIONS: Chronic | Status: ACTIVE | Noted: 2024-11-18

## 2024-11-22 PROBLEM — F10.11 ALCOHOL ABUSE, IN REMISSION: Chronic | Status: ACTIVE | Noted: 2024-11-18

## 2024-11-22 PROBLEM — R00.1 BRADYCARDIA, UNSPECIFIED: Chronic | Status: ACTIVE | Noted: 2024-11-18

## 2024-11-22 PROBLEM — G47.30 SLEEP APNEA, UNSPECIFIED: Chronic | Status: ACTIVE | Noted: 2024-11-18

## 2024-11-22 PROBLEM — J34.89 OTHER SPECIFIED DISORDERS OF NOSE AND NASAL SINUSES: Chronic | Status: ACTIVE | Noted: 2024-11-18

## 2024-11-22 PROBLEM — E66.01 MORBID (SEVERE) OBESITY DUE TO EXCESS CALORIES: Chronic | Status: ACTIVE | Noted: 2024-11-18

## 2024-11-22 PROBLEM — R42 DIZZINESS AND GIDDINESS: Chronic | Status: ACTIVE | Noted: 2024-11-18

## 2024-11-22 PROBLEM — N39.3 STRESS INCONTINENCE (FEMALE) (MALE): Chronic | Status: ACTIVE | Noted: 2024-11-18

## 2024-11-22 PROBLEM — D64.9 ANEMIA, UNSPECIFIED: Chronic | Status: ACTIVE | Noted: 2024-11-18

## 2024-12-12 ENCOUNTER — APPOINTMENT (OUTPATIENT)
Dept: UROGYNECOLOGY | Facility: CLINIC | Age: 54
End: 2024-12-12

## 2025-01-09 ENCOUNTER — APPOINTMENT (OUTPATIENT)
Dept: UROGYNECOLOGY | Facility: CLINIC | Age: 55
End: 2025-01-09

## 2025-01-23 ENCOUNTER — APPOINTMENT (OUTPATIENT)
Dept: OBGYN | Facility: CLINIC | Age: 55
End: 2025-01-23

## 2025-01-23 VITALS
DIASTOLIC BLOOD PRESSURE: 81 MMHG | HEIGHT: 61 IN | WEIGHT: 175 LBS | SYSTOLIC BLOOD PRESSURE: 127 MMHG | BODY MASS INDEX: 33.04 KG/M2

## 2025-01-23 PROCEDURE — 99459 PELVIC EXAMINATION: CPT

## 2025-01-23 PROCEDURE — 99396 PREV VISIT EST AGE 40-64: CPT

## 2025-01-28 LAB
CYTOLOGY CVX/VAG DOC THIN PREP: NORMAL
HPV HIGH+LOW RISK DNA PNL CVX: NOT DETECTED

## 2025-02-07 NOTE — CONSULT NOTE ADULT - CRITICAL CARE SERVICES
2025  EMPLOYEE INFORMATION: EMPLOYER INFORMATION:   NAME: Thuy KELLY   : [id number]    DATE OF INJURY/EVENT: 2025           [facility] [address]   Treating Provider: Dionte Willard MD  Time In:  10:44 AM Time Out:  12:22 PM      DIAGNOSIS:   1. Cat bite, initial encounter      STATUS: This injury is determined to be WORK RELATED.    RETURN TO WORK: Employee may return to work without restrictions.  Return Date: 2025            RESTRICTIONS:  No Restrictions    TREATMENT PLAN:   Medications for this injury/condition:   Current Outpatient Medications:  amoxicillin-clavulanate (AUGMENTIN) 875-125 MG per tablet, Take 1 tablet by mouth every 12 hours., Disp: 20 tablet, Rfl: 0    No current facility-administered medications for this visit.  Referral/Consult: None  Diagnostic Testing: None       Instructions: None     NEXT RETURN VISIT: Monday, 02/10 at 11:15 AM    Thank you for the privilege of providing medical care for this injury/condition.  If there are any questions, please call the occupational health clinic at Dept: [phone].      Electronically signed on 2025 at 12:13 PM by:   Dionte Willard MD   [facility]      
66

## 2025-02-11 ENCOUNTER — APPOINTMENT (OUTPATIENT)
Dept: DERMATOLOGY | Facility: CLINIC | Age: 55
End: 2025-02-11
Payer: COMMERCIAL

## 2025-02-11 PROCEDURE — 99203 OFFICE O/P NEW LOW 30 MIN: CPT

## 2025-02-13 ENCOUNTER — OUTPATIENT (OUTPATIENT)
Dept: INPATIENT UNIT | Facility: HOSPITAL | Age: 55
LOS: 1 days | Discharge: HOME | End: 2025-02-13
Payer: COMMERCIAL

## 2025-02-13 ENCOUNTER — TRANSCRIPTION ENCOUNTER (OUTPATIENT)
Age: 55
End: 2025-02-13

## 2025-02-13 ENCOUNTER — RESULT REVIEW (OUTPATIENT)
Age: 55
End: 2025-02-13

## 2025-02-13 ENCOUNTER — APPOINTMENT (OUTPATIENT)
Dept: UROGYNECOLOGY | Facility: HOSPITAL | Age: 55
End: 2025-02-13

## 2025-02-13 VITALS
DIASTOLIC BLOOD PRESSURE: 68 MMHG | RESPIRATION RATE: 16 BRPM | OXYGEN SATURATION: 99 % | SYSTOLIC BLOOD PRESSURE: 127 MMHG | TEMPERATURE: 97 F | HEART RATE: 57 BPM | HEIGHT: 61 IN | WEIGHT: 171.96 LBS

## 2025-02-13 VITALS
OXYGEN SATURATION: 100 % | DIASTOLIC BLOOD PRESSURE: 84 MMHG | TEMPERATURE: 97 F | SYSTOLIC BLOOD PRESSURE: 168 MMHG | HEART RATE: 51 BPM | RESPIRATION RATE: 16 BRPM

## 2025-02-13 DIAGNOSIS — Z90.711 ACQUIRED ABSENCE OF UTERUS WITH REMAINING CERVICAL STUMP: Chronic | ICD-10-CM

## 2025-02-13 DIAGNOSIS — Z98.890 OTHER SPECIFIED POSTPROCEDURAL STATES: Chronic | ICD-10-CM

## 2025-02-13 DIAGNOSIS — N39.3 STRESS INCONTINENCE (FEMALE) (MALE): ICD-10-CM

## 2025-02-13 DIAGNOSIS — N81.9 FEMALE GENITAL PROLAPSE, UNSPECIFIED: ICD-10-CM

## 2025-02-13 LAB — BLD GP AB SCN SERPL QL: SIGNIFICANT CHANGE UP

## 2025-02-13 PROCEDURE — 57250 REPAIR RECTUM & VAGINA: CPT | Mod: GC

## 2025-02-13 PROCEDURE — 57288 REPAIR BLADDER DEFECT: CPT | Mod: GC

## 2025-02-13 PROCEDURE — 36415 COLL VENOUS BLD VENIPUNCTURE: CPT

## 2025-02-13 PROCEDURE — 88302 TISSUE EXAM BY PATHOLOGIST: CPT

## 2025-02-13 PROCEDURE — 86900 BLOOD TYPING SEROLOGIC ABO: CPT

## 2025-02-13 PROCEDURE — 88302 TISSUE EXAM BY PATHOLOGIST: CPT | Mod: 26

## 2025-02-13 PROCEDURE — C1771: CPT

## 2025-02-13 PROCEDURE — 86901 BLOOD TYPING SEROLOGIC RH(D): CPT

## 2025-02-13 PROCEDURE — 86850 RBC ANTIBODY SCREEN: CPT

## 2025-02-13 RX ORDER — HYDROMORPHONE HYDROCHLORIDE 4 MG/ML
0.5 INJECTION, SOLUTION INTRAMUSCULAR; INTRAVENOUS; SUBCUTANEOUS
Refills: 0 | Status: DISCONTINUED | OUTPATIENT
Start: 2025-02-13 | End: 2025-02-13

## 2025-02-13 RX ORDER — SODIUM CHLORIDE 9 G/ML
1000 INJECTION, SOLUTION INTRAVENOUS
Refills: 0 | Status: DISCONTINUED | OUTPATIENT
Start: 2025-02-13 | End: 2025-02-13

## 2025-02-13 RX ORDER — OXYCODONE HYDROCHLORIDE 30 MG/1
10 TABLET ORAL ONCE
Refills: 0 | Status: DISCONTINUED | OUTPATIENT
Start: 2025-02-13 | End: 2025-02-13

## 2025-02-13 RX ORDER — OXYCODONE HYDROCHLORIDE 30 MG/1
5 TABLET ORAL ONCE
Refills: 0 | Status: DISCONTINUED | OUTPATIENT
Start: 2025-02-13 | End: 2025-02-13

## 2025-02-13 RX ORDER — ONDANSETRON 4 MG/1
4 TABLET, ORALLY DISINTEGRATING ORAL ONCE
Refills: 0 | Status: DISCONTINUED | OUTPATIENT
Start: 2025-02-13 | End: 2025-02-13

## 2025-02-13 RX ORDER — FENTANYL CITRATE 50 UG/ML
25 INJECTION INTRAMUSCULAR; INTRAVENOUS
Refills: 0 | Status: DISCONTINUED | OUTPATIENT
Start: 2025-02-13 | End: 2025-02-13

## 2025-02-13 RX ADMIN — OXYCODONE HYDROCHLORIDE 10 MILLIGRAM(S): 30 TABLET ORAL at 12:08

## 2025-02-13 RX ADMIN — OXYCODONE HYDROCHLORIDE 10 MILLIGRAM(S): 30 TABLET ORAL at 12:05

## 2025-02-13 NOTE — ASU DISCHARGE PLAN (ADULT/PEDIATRIC) - CARE PROVIDER_API CALL
Irlanda Conner  Urogyn and Reconst Pelvic Surg  [address] [place] [id number]  Phone: [phone]  Fax: [phone]  Established Patient  Follow Up Time: 2 weeks

## 2025-02-13 NOTE — BRIEF OPERATIVE NOTE - OPERATION/FINDINGS
EUA: normal external genitalia, posterior compartment proalpse  An Advantage fit retropubic midurethral sling was placed in the usual fashion. Cystoscopy revealed a normal appearing bladder without evidence of injury. Efflux of urine was visualized from the bilateral ureters. The vagina was reapproximated with suture. A posterior colporrhaphy was performed in the usual fashion and the vagina was reapproximated with suture.

## 2025-02-13 NOTE — ASU DISCHARGE PLAN (ADULT/PEDIATRIC) - NURSING INSTRUCTIONS
For any problems or concerns,contact your doctor. [facility] patients should call the [facility]. If you cannot reach the doctor or clinic, call [facility] Emergency Department at [phone] or go to your local Emergency Department.  A responsible adult should be with you for the rest of the day and night for your safety and to help you if you needed. Resume your medications as listed on the attached Medication Record. Begin with liquids and light food ( tea, toast, Jello, soups). Advance to what you normally eat. Liquids should taken in adequate amounts today.     CALL the DOCTOR:    -Fever greater than  101F  - Signs  of infection such as : increase pain,swelling,redness,or a bad  smell coming from the wound.  -Excessive amount of bleeding.  - Any pain that appears to be getting worse.  - Vomiting  -  If you have  not urinated 8 hours after surgery or have any difficulty urinating.     A responsible adult should be with you for the rest of the day and night for your safety and to help you if you needed.    Review attached FACT SHEET if applicable.

## 2025-02-13 NOTE — CHART NOTE - NSCHARTNOTEFT_GEN_A_CORE
Patient seen in ASU. Doing well. Pain is controlled with PO medication. She is tolerating PO intake without N/V. Minimal vaginal bleeding. 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 Will have patient complete passive void trial.

## 2025-02-13 NOTE — BRIEF OPERATIVE NOTE - NSICDXBRIEFPROCEDURE_GEN_ALL_CORE_FT
PROCEDURES:  Posterior colporrhaphy 13-Feb-2025 11:57:29  Gudelia Medrano  Creation, midurethral sling, with cystoscopy 13-Feb-2025 11:57:38  Gudelia Medrano
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
## 2025-02-13 NOTE — BRIEF OPERATIVE NOTE - NSICDXBRIEFPOSTOP_GEN_ALL_CORE_FT
POST-OP DIAGNOSIS:  JACKELIN (stress urinary incontinence, female) 13-Feb-2025 11:57:59  Gudelia Medrano  Rectocele 13-Feb-2025 11:58:05  Gudelia Medrano

## 2025-02-13 NOTE — ASU DISCHARGE PLAN (ADULT/PEDIATRIC) - FINANCIAL ASSISTANCE
[facility] provides services at a reduced cost to those who are determined to be eligible through [facility]’s financial assistance program. Information regarding [facility]’s financial assistance program can be found by going to [email or web address] or by calling [phone].

## 2025-02-13 NOTE — BRIEF OPERATIVE NOTE - NSICDXBRIEFPREOP_GEN_ALL_CORE_FT
PRE-OP DIAGNOSIS:  JACKELIN (stress urinary incontinence, female) 13-Feb-2025 11:57:46  Gudelia Medrano  Rectocele 13-Feb-2025 11:57:52  Gudelia Medrano

## 2025-02-13 NOTE — ASU DISCHARGE PLAN (ADULT/PEDIATRIC) - NS MD DC FALL RISK RISK
For information on Fall & Injury Prevention, visit: [email or web address] OR  [email or web address] OR  https://www.cdc.gov/steadi/patient.html

## 2025-02-13 NOTE — ASU DISCHARGE PLAN (ADULT/PEDIATRIC) - ACTIVITY LEVEL
Walking and stairs are allowed/No exercise/No heavy lifting/Nothing per vagina/No tub baths/No tampons/No intercourse

## 2025-02-19 LAB — SURGICAL PATHOLOGY STUDY: SIGNIFICANT CHANGE UP

## 2025-02-20 DIAGNOSIS — E78.00 PURE HYPERCHOLESTEROLEMIA, UNSPECIFIED: ICD-10-CM

## 2025-02-20 DIAGNOSIS — I10 ESSENTIAL (PRIMARY) HYPERTENSION: ICD-10-CM

## 2025-02-20 DIAGNOSIS — Z86.73 PERSONAL HISTORY OF TRANSIENT ISCHEMIC ATTACK (TIA), AND CEREBRAL INFARCTION WITHOUT RESIDUAL DEFICITS: ICD-10-CM

## 2025-02-20 DIAGNOSIS — F12.90 CANNABIS USE, UNSPECIFIED, UNCOMPLICATED: ICD-10-CM

## 2025-02-20 DIAGNOSIS — F32.9 MAJOR DEPRESSIVE DISORDER, SINGLE EPISODE, UNSPECIFIED: ICD-10-CM

## 2025-02-20 DIAGNOSIS — F41.9 ANXIETY DISORDER, UNSPECIFIED: ICD-10-CM

## 2025-02-20 DIAGNOSIS — N81.6 RECTOCELE: ICD-10-CM

## 2025-02-20 DIAGNOSIS — Z88.1 ALLERGY STATUS TO OTHER ANTIBIOTIC AGENTS: ICD-10-CM

## 2025-02-20 DIAGNOSIS — N39.3 STRESS INCONTINENCE (FEMALE) (MALE): ICD-10-CM

## 2025-02-20 DIAGNOSIS — Z88.8 ALLERGY STATUS TO OTHER DRUGS, MEDICAMENTS AND BIOLOGICAL SUBSTANCES: ICD-10-CM

## 2025-02-20 DIAGNOSIS — Z87.891 PERSONAL HISTORY OF NICOTINE DEPENDENCE: ICD-10-CM

## 2025-02-20 DIAGNOSIS — J45.909 UNSPECIFIED ASTHMA, UNCOMPLICATED: ICD-10-CM

## 2025-02-20 DIAGNOSIS — Z91.041 RADIOGRAPHIC DYE ALLERGY STATUS: ICD-10-CM

## 2025-02-27 ENCOUNTER — APPOINTMENT (OUTPATIENT)
Dept: UROGYNECOLOGY | Facility: CLINIC | Age: 55
End: 2025-02-27

## 2025-02-27 VITALS
WEIGHT: 175 LBS | DIASTOLIC BLOOD PRESSURE: 83 MMHG | BODY MASS INDEX: 33.04 KG/M2 | HEART RATE: 101 BPM | TEMPERATURE: 97.3 F | HEIGHT: 61 IN | OXYGEN SATURATION: 98 % | SYSTOLIC BLOOD PRESSURE: 115 MMHG

## 2025-02-27 DIAGNOSIS — N94.89 OTHER SPECIFIED CONDITIONS ASSOCIATED WITH FEMALE GENITAL ORGANS AND MENSTRUAL CYCLE: ICD-10-CM

## 2025-02-27 DIAGNOSIS — Z98.890 OTHER SPECIFIED POSTPROCEDURAL STATES: ICD-10-CM

## 2025-02-27 LAB
BILIRUB UR QL STRIP: NORMAL
BILIRUB UR QL STRIP: NORMAL
CLARITY UR: CLEAR
CLARITY UR: CLEAR
COLLECTION METHOD: NORMAL
COLLECTION METHOD: NORMAL
GLUCOSE UR-MCNC: NEGATIVE
GLUCOSE UR-MCNC: NEGATIVE
HCG UR QL: 0.2 EU/DL
HCG UR QL: 0.2 EU/DL
HGB UR QL STRIP.AUTO: NEGATIVE
HGB UR QL STRIP.AUTO: NEGATIVE
KETONES UR-MCNC: 15
KETONES UR-MCNC: NORMAL
LEUKOCYTE ESTERASE UR QL STRIP: NEGATIVE
LEUKOCYTE ESTERASE UR QL STRIP: NORMAL
NITRITE UR QL STRIP: NEGATIVE
NITRITE UR QL STRIP: NEGATIVE
PH UR STRIP: 5.5
PH UR STRIP: 5.5
PROT UR STRIP-MCNC: NEGATIVE
PROT UR STRIP-MCNC: NEGATIVE
SP GR UR STRIP: 1.02
SP GR UR STRIP: 1.02

## 2025-02-27 PROCEDURE — 51798 US URINE CAPACITY MEASURE: CPT

## 2025-02-27 PROCEDURE — 81003 URINALYSIS AUTO W/O SCOPE: CPT | Mod: QW

## 2025-02-27 PROCEDURE — 99024 POSTOP FOLLOW-UP VISIT: CPT

## 2025-03-03 LAB
APPEARANCE: CLEAR
BACTERIA UR CULT: NORMAL
BACTERIA: NEGATIVE /HPF
BILIRUBIN URINE: NEGATIVE
BLOOD URINE: NEGATIVE
CANDIDA VAG CYTO: NOT DETECTED
CAST: 11 /LPF
COLOR: YELLOW
EPITHELIAL CELLS: 1 /HPF
G VAGINALIS+PREV SP MTYP VAG QL MICRO: NOT DETECTED
GLUCOSE QUALITATIVE U: NEGATIVE MG/DL
HYALINE CASTS: PRESENT
KETONES URINE: NEGATIVE MG/DL
LEUKOCYTE ESTERASE URINE: NEGATIVE
MICROSCOPIC-UA: NORMAL
NITRITE URINE: NEGATIVE
PH URINE: 5.5
PROTEIN URINE: NEGATIVE MG/DL
RED BLOOD CELLS URINE: 1 /HPF
REVIEW: NORMAL
SPECIFIC GRAVITY URINE: 1.01
T VAGINALIS VAG QL WET PREP: NOT DETECTED
UROBILINOGEN URINE: 0.2 MG/DL
WHITE BLOOD CELLS URINE: 0 /HPF

## 2025-03-24 ENCOUNTER — NON-APPOINTMENT (OUTPATIENT)
Age: 55
End: 2025-03-24

## 2025-03-24 ENCOUNTER — APPOINTMENT (OUTPATIENT)
Dept: UROGYNECOLOGY | Facility: CLINIC | Age: 55
End: 2025-03-24
Payer: COMMERCIAL

## 2025-03-24 VITALS
BODY MASS INDEX: 30.96 KG/M2 | WEIGHT: 164 LBS | HEART RATE: 70 BPM | SYSTOLIC BLOOD PRESSURE: 148 MMHG | HEIGHT: 61 IN | OXYGEN SATURATION: 98 % | TEMPERATURE: 97.7 F | DIASTOLIC BLOOD PRESSURE: 88 MMHG

## 2025-03-24 DIAGNOSIS — Z98.890 OTHER SPECIFIED POSTPROCEDURAL STATES: ICD-10-CM

## 2025-03-24 LAB
BILIRUB UR QL STRIP: NEGATIVE
CLARITY UR: NORMAL
COLLECTION METHOD: NORMAL
GLUCOSE UR-MCNC: NEGATIVE
HCG UR QL: 0.2
HGB UR QL STRIP.AUTO: NORMAL
KETONES UR-MCNC: NEGATIVE
LEUKOCYTE ESTERASE UR QL STRIP: NORMAL
NITRITE UR QL STRIP: POSITIVE
PH UR STRIP: 6
PROT UR STRIP-MCNC: NEGATIVE
SP GR UR STRIP: 1.01

## 2025-03-24 PROCEDURE — 99024 POSTOP FOLLOW-UP VISIT: CPT

## 2025-03-24 PROCEDURE — 81003 URINALYSIS AUTO W/O SCOPE: CPT | Mod: QW

## 2025-03-24 PROCEDURE — 51798 US URINE CAPACITY MEASURE: CPT

## 2025-03-25 LAB
APPEARANCE: ABNORMAL
BACTERIA: ABNORMAL /HPF
BILIRUBIN URINE: NEGATIVE
BLOOD URINE: ABNORMAL
CAST: 5 /LPF
COLOR: YELLOW
EPITHELIAL CELLS: 0 /HPF
GLUCOSE QUALITATIVE U: NEGATIVE MG/DL
KETONES URINE: NEGATIVE MG/DL
LEUKOCYTE ESTERASE URINE: ABNORMAL
MICROSCOPIC-UA: NORMAL
NITRITE URINE: POSITIVE
PH URINE: 6
PROTEIN URINE: NEGATIVE MG/DL
RED BLOOD CELLS URINE: 1 /HPF
REVIEW: NORMAL
SPECIFIC GRAVITY URINE: 1.01
UROBILINOGEN URINE: 0.2 MG/DL
WHITE BLOOD CELLS URINE: 365 /HPF

## 2025-03-28 LAB — BACTERIA UR CULT: ABNORMAL

## 2025-03-28 RX ORDER — CEPHALEXIN 500 MG/1
500 CAPSULE ORAL
Qty: 14 | Refills: 0 | Status: ACTIVE | COMMUNITY
Start: 2025-03-28 | End: 1900-01-01

## 2025-04-28 ENCOUNTER — APPOINTMENT (OUTPATIENT)
Dept: UROGYNECOLOGY | Facility: CLINIC | Age: 55
End: 2025-04-28

## 2025-05-01 ENCOUNTER — APPOINTMENT (OUTPATIENT)
Dept: UROGYNECOLOGY | Facility: CLINIC | Age: 55
End: 2025-05-01

## 2025-05-12 ENCOUNTER — APPOINTMENT (OUTPATIENT)
Dept: UROGYNECOLOGY | Facility: CLINIC | Age: 55
End: 2025-05-12

## 2025-05-12 VITALS
OXYGEN SATURATION: 98 % | HEART RATE: 66 BPM | HEIGHT: 61 IN | DIASTOLIC BLOOD PRESSURE: 68 MMHG | SYSTOLIC BLOOD PRESSURE: 110 MMHG

## 2025-05-12 LAB
BILIRUB UR QL STRIP: NEGATIVE
CLARITY UR: CLEAR
COLLECTION METHOD: NORMAL
GLUCOSE UR-MCNC: NEGATIVE
HCG UR QL: 0.2 EU/DL
HGB UR QL STRIP.AUTO: NEGATIVE
KETONES UR-MCNC: NEGATIVE
LEUKOCYTE ESTERASE UR QL STRIP: NEGATIVE
NITRITE UR QL STRIP: NEGATIVE
PH UR STRIP: 6
PROT UR STRIP-MCNC: NEGATIVE
SP GR UR STRIP: <=1.005

## 2025-05-12 PROCEDURE — 64561 IMPLANT NEUROELECTRODES: CPT | Mod: 50,58

## 2025-05-12 PROCEDURE — 81003 URINALYSIS AUTO W/O SCOPE: CPT | Mod: QW

## 2025-05-15 ENCOUNTER — APPOINTMENT (OUTPATIENT)
Dept: UROGYNECOLOGY | Facility: CLINIC | Age: 55
End: 2025-05-15

## 2025-05-15 DIAGNOSIS — R35.1 NOCTURIA: ICD-10-CM

## 2025-05-15 DIAGNOSIS — R35.0 FREQUENCY OF MICTURITION: ICD-10-CM

## 2025-05-15 DIAGNOSIS — N32.81 OVERACTIVE BLADDER: ICD-10-CM

## 2025-05-15 LAB
BILIRUB UR QL STRIP: NEGATIVE
CLARITY UR: CLEAR
COLLECTION METHOD: NORMAL
GLUCOSE UR-MCNC: NEGATIVE
HCG UR QL: 0.2
HGB UR QL STRIP.AUTO: NORMAL
KETONES UR-MCNC: NEGATIVE
LEUKOCYTE ESTERASE UR QL STRIP: NEGATIVE
NITRITE UR QL STRIP: NEGATIVE
PH UR STRIP: 5.5
PROT UR STRIP-MCNC: NEGATIVE
SP GR UR STRIP: 1.01

## 2025-05-15 PROCEDURE — 99212 OFFICE O/P EST SF 10 MIN: CPT

## 2025-05-15 PROCEDURE — 81003 URINALYSIS AUTO W/O SCOPE: CPT | Mod: QW

## 2025-05-15 PROCEDURE — 51798 US URINE CAPACITY MEASURE: CPT

## 2025-05-29 ENCOUNTER — OUTPATIENT (OUTPATIENT)
Dept: OUTPATIENT SERVICES | Facility: HOSPITAL | Age: 55
LOS: 1 days | End: 2025-05-29
Payer: COMMERCIAL

## 2025-05-29 VITALS
HEART RATE: 78 BPM | RESPIRATION RATE: 18 BRPM | WEIGHT: 171.96 LBS | HEIGHT: 61 IN | SYSTOLIC BLOOD PRESSURE: 118 MMHG | DIASTOLIC BLOOD PRESSURE: 78 MMHG | TEMPERATURE: 97 F | OXYGEN SATURATION: 99 %

## 2025-05-29 DIAGNOSIS — N32.81 OVERACTIVE BLADDER: ICD-10-CM

## 2025-05-29 DIAGNOSIS — Z13.89 ENCOUNTER FOR SCREENING FOR OTHER DISORDER: ICD-10-CM

## 2025-05-29 DIAGNOSIS — Z87.898 PERSONAL HISTORY OF OTHER SPECIFIED CONDITIONS: ICD-10-CM

## 2025-05-29 DIAGNOSIS — I10 ESSENTIAL (PRIMARY) HYPERTENSION: ICD-10-CM

## 2025-05-29 DIAGNOSIS — Z98.890 OTHER SPECIFIED POSTPROCEDURAL STATES: Chronic | ICD-10-CM

## 2025-05-29 DIAGNOSIS — Z01.818 ENCOUNTER FOR OTHER PREPROCEDURAL EXAMINATION: ICD-10-CM

## 2025-05-29 DIAGNOSIS — Z86.73 PERSONAL HISTORY OF TRANSIENT ISCHEMIC ATTACK (TIA), AND CEREBRAL INFARCTION WITHOUT RESIDUAL DEFICITS: ICD-10-CM

## 2025-05-29 DIAGNOSIS — Z90.711 ACQUIRED ABSENCE OF UTERUS WITH REMAINING CERVICAL STUMP: Chronic | ICD-10-CM

## 2025-05-29 DIAGNOSIS — Z29.9 ENCOUNTER FOR PROPHYLACTIC MEASURES, UNSPECIFIED: ICD-10-CM

## 2025-05-29 LAB
A1C WITH ESTIMATED AVERAGE GLUCOSE RESULT: 5.1 % — SIGNIFICANT CHANGE UP (ref 4–5.6)
ANION GAP SERPL CALC-SCNC: 12 MMOL/L — SIGNIFICANT CHANGE UP (ref 5–17)
APPEARANCE UR: CLEAR — SIGNIFICANT CHANGE UP
APTT BLD: 31.1 SEC — SIGNIFICANT CHANGE UP (ref 26.1–36.8)
BASOPHILS # BLD AUTO: 0.05 K/UL — SIGNIFICANT CHANGE UP (ref 0–0.2)
BASOPHILS NFR BLD AUTO: 0.7 % — SIGNIFICANT CHANGE UP (ref 0–2)
BILIRUB UR-MCNC: NEGATIVE — SIGNIFICANT CHANGE UP
BLD GP AB SCN SERPL QL: SIGNIFICANT CHANGE UP
BUN SERPL-MCNC: 12.1 MG/DL — SIGNIFICANT CHANGE UP (ref 8–20)
CALCIUM SERPL-MCNC: 9.5 MG/DL — SIGNIFICANT CHANGE UP (ref 8.4–10.5)
CHLORIDE SERPL-SCNC: 96 MMOL/L — SIGNIFICANT CHANGE UP (ref 96–108)
CO2 SERPL-SCNC: 25 MMOL/L — SIGNIFICANT CHANGE UP (ref 22–29)
COLOR SPEC: YELLOW — SIGNIFICANT CHANGE UP
CREAT SERPL-MCNC: 1.07 MG/DL — SIGNIFICANT CHANGE UP (ref 0.5–1.3)
DIFF PNL FLD: NEGATIVE — SIGNIFICANT CHANGE UP
EGFR: 62 ML/MIN/1.73M2 — SIGNIFICANT CHANGE UP
EGFR: 62 ML/MIN/1.73M2 — SIGNIFICANT CHANGE UP
EOSINOPHIL # BLD AUTO: 0.09 K/UL — SIGNIFICANT CHANGE UP (ref 0–0.5)
EOSINOPHIL NFR BLD AUTO: 1.2 % — SIGNIFICANT CHANGE UP (ref 0–6)
ESTIMATED AVERAGE GLUCOSE: 100 MG/DL — SIGNIFICANT CHANGE UP (ref 68–114)
GLUCOSE SERPL-MCNC: 83 MG/DL — SIGNIFICANT CHANGE UP (ref 70–99)
GLUCOSE UR QL: NEGATIVE MG/DL — SIGNIFICANT CHANGE UP
HCT VFR BLD CALC: 37.5 % — SIGNIFICANT CHANGE UP (ref 34.5–45)
HGB BLD-MCNC: 12.3 G/DL — SIGNIFICANT CHANGE UP (ref 11.5–15.5)
IMM GRANULOCYTES # BLD AUTO: 0.03 K/UL — SIGNIFICANT CHANGE UP (ref 0–0.07)
IMM GRANULOCYTES NFR BLD AUTO: 0.4 % — SIGNIFICANT CHANGE UP (ref 0–0.9)
INR BLD: 0.94 RATIO — SIGNIFICANT CHANGE UP (ref 0.85–1.16)
KETONES UR QL: NEGATIVE MG/DL — SIGNIFICANT CHANGE UP
LEUKOCYTE ESTERASE UR-ACNC: NEGATIVE — SIGNIFICANT CHANGE UP
LYMPHOCYTES # BLD AUTO: 2.47 K/UL — SIGNIFICANT CHANGE UP (ref 1–3.3)
LYMPHOCYTES NFR BLD AUTO: 33.2 % — SIGNIFICANT CHANGE UP (ref 13–44)
MCHC RBC-ENTMCNC: 28.5 PG — SIGNIFICANT CHANGE UP (ref 27–34)
MCHC RBC-ENTMCNC: 32.8 G/DL — SIGNIFICANT CHANGE UP (ref 32–36)
MCV RBC AUTO: 86.8 FL — SIGNIFICANT CHANGE UP (ref 80–100)
MONOCYTES # BLD AUTO: 0.5 K/UL — SIGNIFICANT CHANGE UP (ref 0–0.9)
MONOCYTES NFR BLD AUTO: 6.7 % — SIGNIFICANT CHANGE UP (ref 2–14)
NEUTROPHILS # BLD AUTO: 4.3 K/UL — SIGNIFICANT CHANGE UP (ref 1.8–7.4)
NEUTROPHILS NFR BLD AUTO: 57.8 % — SIGNIFICANT CHANGE UP (ref 43–77)
NITRITE UR-MCNC: NEGATIVE — SIGNIFICANT CHANGE UP
NRBC # BLD AUTO: 0 K/UL — SIGNIFICANT CHANGE UP (ref 0–0)
NRBC # FLD: 0 K/UL — SIGNIFICANT CHANGE UP (ref 0–0)
NRBC BLD AUTO-RTO: 0 /100 WBCS — SIGNIFICANT CHANGE UP (ref 0–0)
PH UR: 6.5 — SIGNIFICANT CHANGE UP (ref 5–8)
PLATELET # BLD AUTO: 349 K/UL — SIGNIFICANT CHANGE UP (ref 150–400)
PMV BLD: 9.2 FL — SIGNIFICANT CHANGE UP (ref 7–13)
POTASSIUM SERPL-MCNC: 4.5 MMOL/L — SIGNIFICANT CHANGE UP (ref 3.5–5.3)
POTASSIUM SERPL-SCNC: 4.5 MMOL/L — SIGNIFICANT CHANGE UP (ref 3.5–5.3)
PROT UR-MCNC: NEGATIVE MG/DL — SIGNIFICANT CHANGE UP
PROTHROM AB SERPL-ACNC: 10.9 SEC — SIGNIFICANT CHANGE UP (ref 9.9–13.4)
RBC # BLD: 4.32 M/UL — SIGNIFICANT CHANGE UP (ref 3.8–5.2)
RBC # FLD: 13.3 % — SIGNIFICANT CHANGE UP (ref 10.3–14.5)
SODIUM SERPL-SCNC: 133 MMOL/L — LOW (ref 135–145)
SP GR SPEC: 1 — SIGNIFICANT CHANGE UP (ref 1–1.03)
UROBILINOGEN FLD QL: 0.2 MG/DL — SIGNIFICANT CHANGE UP (ref 0.2–1)
WBC # BLD: 7.44 K/UL — SIGNIFICANT CHANGE UP (ref 3.8–10.5)
WBC # FLD AUTO: 7.44 K/UL — SIGNIFICANT CHANGE UP (ref 3.8–10.5)

## 2025-05-29 PROCEDURE — 85025 COMPLETE CBC W/AUTO DIFF WBC: CPT

## 2025-05-29 PROCEDURE — 36415 COLL VENOUS BLD VENIPUNCTURE: CPT

## 2025-05-29 PROCEDURE — 85730 THROMBOPLASTIN TIME PARTIAL: CPT

## 2025-05-29 PROCEDURE — 80048 BASIC METABOLIC PNL TOTAL CA: CPT

## 2025-05-29 PROCEDURE — 93010 ELECTROCARDIOGRAM REPORT: CPT

## 2025-05-29 PROCEDURE — 86900 BLOOD TYPING SEROLOGIC ABO: CPT

## 2025-05-29 PROCEDURE — 87086 URINE CULTURE/COLONY COUNT: CPT

## 2025-05-29 PROCEDURE — 86850 RBC ANTIBODY SCREEN: CPT

## 2025-05-29 PROCEDURE — 83036 HEMOGLOBIN GLYCOSYLATED A1C: CPT

## 2025-05-29 PROCEDURE — 81003 URINALYSIS AUTO W/O SCOPE: CPT

## 2025-05-29 PROCEDURE — 93005 ELECTROCARDIOGRAM TRACING: CPT

## 2025-05-29 PROCEDURE — G0463: CPT

## 2025-05-29 PROCEDURE — 85610 PROTHROMBIN TIME: CPT

## 2025-05-29 PROCEDURE — 86901 BLOOD TYPING SEROLOGIC RH(D): CPT

## 2025-05-29 RX ORDER — LAMIVUDINE 10 MG/ML
1 SOLUTION ORAL
Refills: 0 | DISCHARGE

## 2025-05-29 NOTE — H&P PST ADULT - ASSESSMENT
This is a 55  year old  female presenting today for PST, PMH includes obesity, Prediabetes, HTN, GERD, HLD, CVA, anemia, gastroparesis and bipolar disorder. Patient c/o urgency, incontinence and increased urinary frequency. She is  s/p PNE  with 80% improved with no leaks with urge she can make it to the rest room without having any accidents. Reports nocturia at least 5x a night, intermittent dysuria and reoccurring UTIs last infection 3/25/25 (treated with Doxycycline). States felt good with the PNE trial and is looking forward to permanent placement of Axonics. She denies fever, chills, hematuria or current dysuria. Scheduled for Axonics permanent placement of leads battery programming on 25 with Dr. Conner pending medical and cardiac optimization.           Labs, and EKG performed. Written and verbal instructions provided. Patient educated on surgical scrub, preadmission instructions, clearance and day of procedure medications, verbalizes understanding.  Patient instructed to stop vitamins/supplements/herbal medications/NSAIDS for one week prior to surgery and discuss with PMD, verbalized understanding. Patient instructed to speak with neurologist for Plavix instructions. Patient provided instructions on Zepbound. Patient verbalized understanding of instructions and was given the opportunity to ask questions. Out patient medications reviewed and verified with patient.        CAPRINI SCORE    AGE RELATED RISK FACTORS                                                             [X ] Age 41-60 years                                            (1 Point)  [ ] Age: 61-74 years                                           (2 Points)                 [ ] Age= 75 years                                                (3 Points)             DISEASE RELATED RISK FACTORS                                                       [ ] Edema in the lower extremities                 (1 Point)                     [ ] Varicose veins                                               (1 Point)                                 [X] BMI > 25 Kg/m2                                            (1 Point)                                  [ ] Serious infection (ie PNA)                            (1 Point)                     [ ] Lung disease ( COPD, Emphysema)            (1 Point)                                                                          [ ] Acute myocardial infarction                         (1 Point)                  [ ] Congestive heart failure (in the previous month)  (1 Point)         [ ] Inflammatory bowel disease                            (1 Point)                  [ ] Central venous access, PICC or Port               (2 points)       (within the last month)                                                                [ ] Stroke (in the previous month)                        (5 Points)    [ ] Previous or present malignancy                       (2 points)                                                                                                                                                         HEMATOLOGY RELATED FACTORS                                                         [ ] Prior episodes of VTE                                     (3 Points)                     [ ] Positive family history for VTE                      (3 Points)                  [ ] Prothrombin 37955 A                                     (3 Points)                     [ ] Factor V Leiden                                                (3 Points)                        [ ] Lupus anticoagulants                                      (3 Points)                                                           [ ] Anticardiolipin antibodies                              (3 Points)                                                       [ ] High homocysteine in the blood                   (3 Points)                                             [ ] Other congenital or acquired thrombophilia      (3 Points)                                                [ ] Heparin induced thrombocytopenia                  (3 Points)                                        MOBILITY RELATED FACTORS  [ ] Bed rest                                                         (1 Point)  [ ] Plaster cast                                                    (2 points)  [ ] Bed bound for more than 72 hours           (2 Points)    GENDER SPECIFIC FACTORS  [ ] Pregnancy or had a baby within the last month   (1 Point)  [ ] Post-partum < 6 weeks                                   (1 Point)  [ ] Hormonal therapy  or oral contraception   (1 Point)  [ ] History of pregnancy complications              (1 point)  [ ] Unexplained or recurrent              (1 Point)    OTHER RISK FACTORS                                           (1 Point)  [X ] BMI >40, smoking, diabetes requiring insulin, chemotherapy  blood transfusions and length of surgery over 2 hours    SURGERY RELATED RISK FACTORS  [ ]  Section within the last month     (1 Point)  [ ] Minor surgery                                                  (1 Point)  [ ] Arthroscopic surgery                                       (2 Points)  [ X] Planned major surgery lasting more            (2 Points)      than 45 minutes     [ ] Elective hip or knee joint replacement       (5 points)       surgery                                                TRAUMA RELATED RISK FACTORS  [ ] Fracture of the hip, pelvis, or leg                       (5 Points)  [ ] Spinal cord injury resulting in paralysis             (5 points)       (in the previous month)    [ ] Paralysis  (less than 1 month)                             (5 Points)  [ ] Multiple Trauma within 1 month                        (5 Points)    Total Score [   5     ]    Caprini Score 0-2: Low Risk, NO VTE prophylaxis required for most patients, encourage ambulation  Caprini Score 3-6: Moderate Risk , pharmacologic VTE prophylaxis is indicated for most patients (in the absence of contraindications)  Caprini Score Greater than or =7: High risk, pharmocologic VTE prophylaxis indicated for most patients (in the absence of contraindications)      OPIOID RISK TOOL    DARLEEN EACH BOX THAT APPLIES AND ADD TOTALS AT THE END    FAMILY HISTORY OF SUBSTANCE ABUSE                 FEMALE         MALE                                                Alcohol                             [  ]1 pt          [  ]3pts                                               Illegal Durgs                     [  ]2 pts        [  ]3pts                                               Rx Drugs                           [  ]4 pts        [  ]4 pts    PERSONAL HISTORY OF SUBSTANCE ABUSE                                                                                          Alcohol                             [  X]3 pts       [  ]3 pts                                               Illegal Drugs                     [  ]4 pts        [  ]4 pts                                               Rx Drugs                           [ X ]5 pts        [  ]5 pts    AGE BETWEEN 16-45 YEARS                                      [  ]1 pt         [  ]1 pt    HISTORY OF PREADOLESCENT   SEXUAL ABUSE                                                             [  ]3 pts        [  ]0pts    PSYCHOLOGICAL DISEASE                     ADD, OCD, Bipolar, Schizophrenia        [  ]2 pts         [  ]2 pts                      Depression                                               [  ]1 pt           [  ]1 pt           SCORING TOTAL   (add numbers and type here)              (*8**)                                     A score of 3 or lower indicated LOW risk for future opioid abuse  A score of 4 to 7 indicated moderate risk for future opioid abuse  A score of 8 or higher indicates a high risk for opioid abuse

## 2025-05-29 NOTE — H&P PST ADULT - HISTORY OF PRESENT ILLNESS
Fever cough sob Patient is a  year old  male presenting today for PST, PMH includes   2023  reports dysuria, recurrent UTI last infection 3/25/2025 treated with doxycycline, urgency, increased frequency, noctuira 5x a night   denies fever, chills,  Patient is a  year old  male presenting today for PST, PMH includes   2023  reports dysuria, recurrent UTI last infection 3/25/2025 treated with doxycycline, urgency, increased frequency, noctuira 5x a night   denies fever, chills,   h/o stomach ulcer 2022 Patient is a 55  year old  female presenting today for PST, PMH includes obesity, HTN, prediabetes, GERD, HLD, CVA, anemia, gastroparesis and bipolar disorder. Patient c/o urgency, incontinence and increased urinary frequency. She is  s/p PNE  with 80% improved with no leaks with urge she can make it to the rest room without having any accidents. Reports nocturia at least 5x a night, intermittent dysuria and reoccurring UTIs last infection 3/25/25 (treated with Doxycycline). States felt good with the PNE trial and is looking forward to permanent placement of Axonics. She denies fever, chills, hematuria or current dysuria. Scheduled for Axonics permanent placement of leads battery programming on 6/18/25 with Dr. Conner pending medical and cardiac optimization.

## 2025-05-29 NOTE — H&P PST ADULT - NEUROLOGICAL COMMENTS
h/o pseudoseizures r/t alcohol and cocaine, multiple CVA, patient states she was recently diagnosed with early onset dementia by neurologist Dr. Wick

## 2025-05-29 NOTE — H&P PST ADULT - PROBLEM SELECTOR PLAN 4
On Plavix  PT/PTT/INR performed  Patient instructed to speak with neurologist for instructions on Plavix, verbalized understanding.

## 2025-05-29 NOTE — H&P PST ADULT - PROBLEM SELECTOR PLAN 2
A1C level performed   Finger stick on day of procedure.  medical clearance pending  Patient on Zepbound instructed last dose on 6/2 no Zepbound on 6/14

## 2025-05-29 NOTE — H&P PST ADULT - CONTACT INFO FOR SLEEP STUDY
Dr. Tijerina  hasn't not followed up in [facility] Dr. Tijerina  hasn't not followed up in awhile patient advised to follow up for new testing.

## 2025-05-29 NOTE — H&P PST ADULT - PROBLEM SELECTOR PLAN 1
Scheduled for [facility] permanent placement of leads battery programming on 6/18/25 with Dr. Conner pending medical and cardiac optimization.

## 2025-05-29 NOTE — H&P PST ADULT - NSICDXPASTSURGICALHX_GEN_ALL_CORE_FT
PAST SURGICAL HISTORY:  H/O colonoscopy     H/O left breast biopsy     History of esophagogastroduodenoscopy (EGD)     History of partial hysterectomy 8/2010

## 2025-05-29 NOTE — H&P PST ADULT - PROBLEM SELECTOR PLAN 5
CAP score 5 patent Moderate Risk,  SCDs ordered for day of procedure.  Surgical team to assess need for VTE prophylaxis

## 2025-05-31 LAB
CULTURE RESULTS: SIGNIFICANT CHANGE UP
SPECIMEN SOURCE: SIGNIFICANT CHANGE UP

## 2025-06-18 ENCOUNTER — OUTPATIENT (OUTPATIENT)
Dept: INPATIENT UNIT | Facility: HOSPITAL | Age: 55
LOS: 1 days | End: 2025-06-18
Payer: COMMERCIAL

## 2025-06-18 ENCOUNTER — APPOINTMENT (OUTPATIENT)
Dept: UROGYNECOLOGY | Facility: HOSPITAL | Age: 55
End: 2025-06-18

## 2025-06-18 ENCOUNTER — TRANSCRIPTION ENCOUNTER (OUTPATIENT)
Age: 55
End: 2025-06-18

## 2025-06-18 VITALS
HEIGHT: 61 IN | WEIGHT: 171.96 LBS | HEART RATE: 60 BPM | OXYGEN SATURATION: 100 % | SYSTOLIC BLOOD PRESSURE: 167 MMHG | TEMPERATURE: 98 F | RESPIRATION RATE: 16 BRPM | DIASTOLIC BLOOD PRESSURE: 77 MMHG

## 2025-06-18 VITALS
OXYGEN SATURATION: 100 % | RESPIRATION RATE: 20 BRPM | DIASTOLIC BLOOD PRESSURE: 72 MMHG | SYSTOLIC BLOOD PRESSURE: 141 MMHG | HEART RATE: 54 BPM

## 2025-06-18 DIAGNOSIS — Z98.890 OTHER SPECIFIED POSTPROCEDURAL STATES: Chronic | ICD-10-CM

## 2025-06-18 DIAGNOSIS — N32.81 OVERACTIVE BLADDER: ICD-10-CM

## 2025-06-18 DIAGNOSIS — Z90.711 ACQUIRED ABSENCE OF UTERUS WITH REMAINING CERVICAL STUMP: Chronic | ICD-10-CM

## 2025-06-18 LAB
ANION GAP SERPL CALC-SCNC: 13 MMOL/L — SIGNIFICANT CHANGE UP (ref 5–17)
BUN SERPL-MCNC: 7.9 MG/DL — LOW (ref 8–20)
CALCIUM SERPL-MCNC: 9 MG/DL — SIGNIFICANT CHANGE UP (ref 8.4–10.5)
CHLORIDE SERPL-SCNC: 103 MMOL/L — SIGNIFICANT CHANGE UP (ref 96–108)
CO2 SERPL-SCNC: 20 MMOL/L — LOW (ref 22–29)
CREAT SERPL-MCNC: 0.88 MG/DL — SIGNIFICANT CHANGE UP (ref 0.5–1.3)
EGFR: 78 ML/MIN/1.73M2 — SIGNIFICANT CHANGE UP
EGFR: 78 ML/MIN/1.73M2 — SIGNIFICANT CHANGE UP
GLUCOSE SERPL-MCNC: 100 MG/DL — HIGH (ref 70–99)
POTASSIUM SERPL-MCNC: 4.7 MMOL/L — SIGNIFICANT CHANGE UP (ref 3.5–5.3)
POTASSIUM SERPL-SCNC: 4.7 MMOL/L — SIGNIFICANT CHANGE UP (ref 3.5–5.3)
SODIUM SERPL-SCNC: 136 MMOL/L — SIGNIFICANT CHANGE UP (ref 135–145)

## 2025-06-18 PROCEDURE — C1767: CPT

## 2025-06-18 PROCEDURE — 64590 INS/RPL PRPH SAC/GSTR NPG/R: CPT

## 2025-06-18 PROCEDURE — C1787: CPT

## 2025-06-18 PROCEDURE — 80048 BASIC METABOLIC PNL TOTAL CA: CPT

## 2025-06-18 PROCEDURE — 76000 FLUOROSCOPY <1 HR PHYS/QHP: CPT

## 2025-06-18 PROCEDURE — C1894: CPT

## 2025-06-18 PROCEDURE — C1778: CPT

## 2025-06-18 PROCEDURE — 64561 IMPLANT NEUROELECTRODES: CPT | Mod: RT

## 2025-06-18 PROCEDURE — 36415 COLL VENOUS BLD VENIPUNCTURE: CPT

## 2025-06-18 PROCEDURE — 64561 IMPLANT NEUROELECTRODES: CPT

## 2025-06-18 DEVICE — CONTROL REMOTE AXONICS DISP NON STRL: Type: IMPLANTABLE DEVICE | Status: FUNCTIONAL

## 2025-06-18 DEVICE — KIT IMP STIMULATOR LEAD STRL: Type: IMPLANTABLE DEVICE | Status: FUNCTIONAL

## 2025-06-18 DEVICE — IMP NEUROSTIMULATOR-F15 AXONICS STRL: Type: IMPLANTABLE DEVICE | Status: FUNCTIONAL

## 2025-06-18 DEVICE — KIT TINED LEAD INSUL: Type: IMPLANTABLE DEVICE | Status: FUNCTIONAL

## 2025-06-18 RX ORDER — ACETAMINOPHEN 500 MG/5ML
1000 LIQUID (ML) ORAL ONCE
Refills: 0 | Status: DISCONTINUED | OUTPATIENT
Start: 2025-06-18 | End: 2025-06-18

## 2025-06-18 RX ORDER — LAMOTRIGINE 150 MG/1
1 TABLET ORAL
Refills: 0 | DISCHARGE

## 2025-06-18 RX ORDER — ACETAMINOPHEN 500 MG/5ML
975 LIQUID (ML) ORAL ONCE
Refills: 0 | Status: ACTIVE | OUTPATIENT
Start: 2025-06-18

## 2025-06-18 RX ORDER — ONDANSETRON HCL/PF 4 MG/2 ML
4 VIAL (ML) INJECTION ONCE
Refills: 0 | Status: DISCONTINUED | OUTPATIENT
Start: 2025-06-18 | End: 2025-06-18

## 2025-06-18 RX ORDER — LIDOCAINE HYDROCHLORIDE 20 MG/ML
1 JELLY TOPICAL
Refills: 0 | DISCHARGE

## 2025-06-18 RX ORDER — HYDROMORPHONE/SOD CHLOR,ISO/PF 2 MG/10 ML
0.5 SYRINGE (ML) INJECTION
Refills: 0 | Status: DISCONTINUED | OUTPATIENT
Start: 2025-06-18 | End: 2025-06-18

## 2025-06-18 RX ORDER — ONDANSETRON HCL/PF 4 MG/2 ML
8 VIAL (ML) INJECTION ONCE
Refills: 0 | Status: ACTIVE | OUTPATIENT
Start: 2025-06-18 | End: 2026-05-17

## 2025-06-18 RX ORDER — CEFAZOLIN SODIUM IN 0.9 % NACL 3 G/100 ML
2000 INTRAVENOUS SOLUTION, PIGGYBACK (ML) INTRAVENOUS ONCE
Refills: 0 | Status: DISCONTINUED | OUTPATIENT
Start: 2025-06-18 | End: 2025-06-18

## 2025-06-18 RX ORDER — HYDROXYZINE HYDROCHLORIDE 25 MG/1
1 TABLET, FILM COATED ORAL
Refills: 0 | DISCHARGE

## 2025-06-18 RX ORDER — SODIUM CHLORIDE 9 G/1000ML
1000 INJECTION, SOLUTION INTRAVENOUS
Refills: 0 | Status: DISCONTINUED | OUTPATIENT
Start: 2025-06-18 | End: 2025-06-18

## 2025-06-18 RX ORDER — FENTANYL CITRATE-0.9 % NACL/PF 100MCG/2ML
50 SYRINGE (ML) INTRAVENOUS
Refills: 0 | Status: DISCONTINUED | OUTPATIENT
Start: 2025-06-18 | End: 2025-06-18

## 2025-06-18 NOTE — BRIEF OPERATIVE NOTE - DISPOSITION
PACU then home Clofazimine Counseling:  I discussed with the patient the risks of clofazimine including but not limited to skin and eye pigmentation, liver damage, nausea/vomiting, gastrointestinal bleeding and allergy.

## 2025-06-18 NOTE — BRIEF OPERATIVE NOTE - NSICDXBRIEFPROCEDURE_GEN_ALL_CORE_FT
PROCEDURES:  Insertion, electrode lead and pulse generator, sacral nerve stimulator, CE2 [employer] 18-Jun-2025 18:48:49  Gudelia Medrano

## 2025-06-18 NOTE — BRIEF OPERATIVE NOTE - OPERATION/FINDINGS
Axonics sacral neuromodulation was placed in the usual fashion under fluoroscopic guidance. Lead and battery on right. The skin was reapproximated with suture.

## 2025-06-18 NOTE — ASU DISCHARGE PLAN (ADULT/PEDIATRIC) - FINANCIAL ASSISTANCE
[facility] provides services at a reduced cost to those who are determined to be eligible through [facility]’s financial assistance program. Information regarding [facility]’s financial assistance program can be found by going to [email or web address] or by calling [phone].

## 2025-06-18 NOTE — ASU PREOP CHECKLIST - LOOSE TEETH
[employer] Counseling: I discussed with the patient the risks of ixekizumab including but not limited to immunosuppression, serious infections, worsening of inflammatory bowel disease and drug reactions.  The patient understands that monitoring is required including a PPD at baseline and must alert us or the primary physician if symptoms of infection or other concerning signs are noted. no

## 2025-06-18 NOTE — ASU DISCHARGE PLAN (ADULT/PEDIATRIC) - CARE PROVIDER_API CALL
Irlanda Conner  Urogynecology and Reconstructive Pelvic Surgery  [address] [place] [id number]  Phone: [phone]  Fax: [phone]  Follow Up Time: 2 weeks

## 2025-06-23 ENCOUNTER — APPOINTMENT (OUTPATIENT)
Dept: UROGYNECOLOGY | Facility: CLINIC | Age: 55
End: 2025-06-23

## 2025-06-23 VITALS
OXYGEN SATURATION: 100 % | WEIGHT: 164 LBS | TEMPERATURE: 98.1 F | BODY MASS INDEX: 30.96 KG/M2 | SYSTOLIC BLOOD PRESSURE: 129 MMHG | DIASTOLIC BLOOD PRESSURE: 82 MMHG | HEART RATE: 55 BPM | HEIGHT: 61 IN

## 2025-06-23 PROBLEM — Z87.898 PERSONAL HISTORY OF OTHER SPECIFIED CONDITIONS: Chronic | Status: ACTIVE | Noted: 2025-05-29

## 2025-06-23 PROCEDURE — 51798 US URINE CAPACITY MEASURE: CPT

## 2025-06-23 PROCEDURE — 99024 POSTOP FOLLOW-UP VISIT: CPT

## 2025-06-23 RX ORDER — CEPHALEXIN 500 MG/1
500 CAPSULE ORAL
Qty: 14 | Refills: 0 | Status: ACTIVE | COMMUNITY
Start: 2025-06-23 | End: 1900-01-01

## 2025-07-01 ENCOUNTER — APPOINTMENT (OUTPATIENT)
Dept: UROGYNECOLOGY | Facility: CLINIC | Age: 55
End: 2025-07-01

## 2025-07-01 VITALS
RESPIRATION RATE: 14 BRPM | HEIGHT: 61 IN | BODY MASS INDEX: 33.23 KG/M2 | OXYGEN SATURATION: 99 % | SYSTOLIC BLOOD PRESSURE: 144 MMHG | WEIGHT: 176 LBS | DIASTOLIC BLOOD PRESSURE: 89 MMHG | HEART RATE: 68 BPM | TEMPERATURE: 98.4 F

## 2025-07-01 LAB
BILIRUB UR QL STRIP: NEGATIVE
CLARITY UR: CLEAR
COLLECTION METHOD: NORMAL
GLUCOSE UR-MCNC: NEGATIVE
HCG UR QL: 0.2 EU/DL
HGB UR QL STRIP.AUTO: NEGATIVE
KETONES UR-MCNC: NEGATIVE
LEUKOCYTE ESTERASE UR QL STRIP: NEGATIVE
NITRITE UR QL STRIP: NEGATIVE
PH UR STRIP: 7
PROT UR STRIP-MCNC: NEGATIVE
SP GR UR STRIP: 1.01

## 2025-07-01 PROCEDURE — 81003 URINALYSIS AUTO W/O SCOPE: CPT | Mod: QW

## 2025-07-01 PROCEDURE — 99212 OFFICE O/P EST SF 10 MIN: CPT

## 2025-07-01 PROCEDURE — 51798 US URINE CAPACITY MEASURE: CPT

## 2025-07-15 ENCOUNTER — APPOINTMENT (OUTPATIENT)
Dept: UROGYNECOLOGY | Facility: CLINIC | Age: 55
End: 2025-07-15

## 2025-07-22 ENCOUNTER — APPOINTMENT (OUTPATIENT)
Dept: UROGYNECOLOGY | Facility: CLINIC | Age: 55
End: 2025-07-22
Payer: COMMERCIAL

## 2025-07-22 VITALS — DIASTOLIC BLOOD PRESSURE: 82 MMHG | SYSTOLIC BLOOD PRESSURE: 127 MMHG

## 2025-07-22 PROCEDURE — 99213 OFFICE O/P EST LOW 20 MIN: CPT

## 2025-07-22 PROCEDURE — 51798 US URINE CAPACITY MEASURE: CPT

## (undated) DEVICE — DRSG STERISTRIPS 0.5 X 4"

## (undated) DEVICE — BLADE SURGICAL #11 CARBON

## (undated) DEVICE — SYR CONTROL LUER LOK 10CC

## (undated) DEVICE — DRAPE C ARM UNIVERSAL

## (undated) DEVICE — SUT VICRYL 2-0 36" CT-1

## (undated) DEVICE — DRAPE XL SHEET 77X98"

## (undated) DEVICE — DRSG TELFA 3 X 4

## (undated) DEVICE — BULB SYRINGE 60CC

## (undated) DEVICE — DRSG TEGADERM 4 X 4.75"

## (undated) DEVICE — WARMING BLANKET UPPER ADULT

## (undated) DEVICE — DRAPE 3/4 SHEET 52X76"

## (undated) DEVICE — DRAPE DOME BAG 22"

## (undated) DEVICE — SUT MONOCRYL 4-0 27" PS-2 UNDYED